# Patient Record
Sex: FEMALE | Race: WHITE | NOT HISPANIC OR LATINO | Employment: OTHER
[De-identification: names, ages, dates, MRNs, and addresses within clinical notes are randomized per-mention and may not be internally consistent; named-entity substitution may affect disease eponyms.]

---

## 2017-04-27 ENCOUNTER — RECORDS - HEALTHEAST (OUTPATIENT)
Dept: ADMINISTRATIVE | Facility: OTHER | Age: 62
End: 2017-04-27

## 2017-05-10 ENCOUNTER — RECORDS - HEALTHEAST (OUTPATIENT)
Dept: ADMINISTRATIVE | Facility: OTHER | Age: 62
End: 2017-05-10

## 2017-05-10 ENCOUNTER — RECORDS - HEALTHEAST (OUTPATIENT)
Dept: LAB | Facility: CLINIC | Age: 62
End: 2017-05-10

## 2017-05-10 LAB
CREAT SERPL-MCNC: 0.97 MG/DL (ref 0.6–1.1)
GFR SERPL CREATININE-BSD FRML MDRD: 58 ML/MIN/1.73M2

## 2017-05-15 ENCOUNTER — INFUSION - HEALTHEAST (OUTPATIENT)
Dept: INFUSION THERAPY | Facility: CLINIC | Age: 62
End: 2017-05-15

## 2017-05-15 DIAGNOSIS — M81.0 OSTEOPOROSIS: ICD-10-CM

## 2018-06-01 ENCOUNTER — RECORDS - HEALTHEAST (OUTPATIENT)
Dept: ADMINISTRATIVE | Facility: OTHER | Age: 63
End: 2018-06-01

## 2018-06-01 ENCOUNTER — RECORDS - HEALTHEAST (OUTPATIENT)
Dept: LAB | Facility: CLINIC | Age: 63
End: 2018-06-01

## 2018-06-01 LAB
ALBUMIN SERPL-MCNC: 4.3 G/DL (ref 3.5–5)
ALP SERPL-CCNC: 67 U/L (ref 45–120)
ALT SERPL W P-5'-P-CCNC: 14 U/L (ref 0–45)
ANION GAP SERPL CALCULATED.3IONS-SCNC: 10 MMOL/L (ref 5–18)
AST SERPL W P-5'-P-CCNC: 17 U/L (ref 0–40)
BILIRUB SERPL-MCNC: 0.5 MG/DL (ref 0–1)
BUN SERPL-MCNC: 14 MG/DL (ref 8–22)
CALCIUM SERPL-MCNC: 10.2 MG/DL (ref 8.5–10.5)
CHLORIDE BLD-SCNC: 103 MMOL/L (ref 98–107)
CHOLEST SERPL-MCNC: 248 MG/DL
CO2 SERPL-SCNC: 26 MMOL/L (ref 22–31)
CREAT SERPL-MCNC: 0.74 MG/DL (ref 0.6–1.1)
FASTING STATUS PATIENT QL REPORTED: ABNORMAL
GFR SERPL CREATININE-BSD FRML MDRD: >60 ML/MIN/1.73M2
GLUCOSE BLD-MCNC: 85 MG/DL (ref 70–125)
HDLC SERPL-MCNC: 85 MG/DL
LDLC SERPL CALC-MCNC: 146 MG/DL
POTASSIUM BLD-SCNC: 3.8 MMOL/L (ref 3.5–5)
PROT SERPL-MCNC: 7.3 G/DL (ref 6–8)
SODIUM SERPL-SCNC: 139 MMOL/L (ref 136–145)
TRIGL SERPL-MCNC: 86 MG/DL

## 2018-06-14 ENCOUNTER — RECORDS - HEALTHEAST (OUTPATIENT)
Dept: ADMINISTRATIVE | Facility: OTHER | Age: 63
End: 2018-06-14

## 2018-06-15 ENCOUNTER — RECORDS - HEALTHEAST (OUTPATIENT)
Dept: ADMINISTRATIVE | Facility: OTHER | Age: 63
End: 2018-06-15

## 2018-06-25 ENCOUNTER — INFUSION - HEALTHEAST (OUTPATIENT)
Dept: INFUSION THERAPY | Facility: CLINIC | Age: 63
End: 2018-06-25

## 2018-06-25 DIAGNOSIS — M81.0 SENILE OSTEOPOROSIS: ICD-10-CM

## 2019-06-03 ENCOUNTER — RECORDS - HEALTHEAST (OUTPATIENT)
Dept: LAB | Facility: CLINIC | Age: 64
End: 2019-06-03

## 2019-06-03 LAB
ALBUMIN SERPL-MCNC: 4.1 G/DL (ref 3.5–5)
ALP SERPL-CCNC: 69 U/L (ref 45–120)
ALT SERPL W P-5'-P-CCNC: 18 U/L (ref 0–45)
ANION GAP SERPL CALCULATED.3IONS-SCNC: 7 MMOL/L (ref 5–18)
AST SERPL W P-5'-P-CCNC: 19 U/L (ref 0–40)
BILIRUB SERPL-MCNC: 0.4 MG/DL (ref 0–1)
BUN SERPL-MCNC: 14 MG/DL (ref 8–22)
CALCIUM SERPL-MCNC: 9.8 MG/DL (ref 8.5–10.5)
CHLORIDE BLD-SCNC: 105 MMOL/L (ref 98–107)
CHOLEST SERPL-MCNC: 231 MG/DL
CO2 SERPL-SCNC: 27 MMOL/L (ref 22–31)
CREAT SERPL-MCNC: 0.72 MG/DL (ref 0.6–1.1)
FASTING STATUS PATIENT QL REPORTED: ABNORMAL
GFR SERPL CREATININE-BSD FRML MDRD: >60 ML/MIN/1.73M2
GLUCOSE BLD-MCNC: 80 MG/DL (ref 70–125)
HDLC SERPL-MCNC: 78 MG/DL
LDLC SERPL CALC-MCNC: 130 MG/DL
POTASSIUM BLD-SCNC: 3.8 MMOL/L (ref 3.5–5)
PROT SERPL-MCNC: 7 G/DL (ref 6–8)
SODIUM SERPL-SCNC: 139 MMOL/L (ref 136–145)
TRIGL SERPL-MCNC: 114 MG/DL

## 2019-06-04 LAB — 25(OH)D3 SERPL-MCNC: 42 NG/ML (ref 30–80)

## 2019-06-27 ENCOUNTER — INFUSION - HEALTHEAST (OUTPATIENT)
Dept: INFUSION THERAPY | Facility: CLINIC | Age: 64
End: 2019-06-27

## 2019-06-27 DIAGNOSIS — M81.0 SENILE OSTEOPOROSIS: ICD-10-CM

## 2020-06-26 ENCOUNTER — RECORDS - HEALTHEAST (OUTPATIENT)
Dept: LAB | Facility: CLINIC | Age: 65
End: 2020-06-26

## 2020-06-26 LAB
ALBUMIN SERPL-MCNC: 4.4 G/DL (ref 3.5–5)
ALP SERPL-CCNC: 77 U/L (ref 45–120)
ALT SERPL W P-5'-P-CCNC: 18 U/L (ref 0–45)
ANION GAP SERPL CALCULATED.3IONS-SCNC: 12 MMOL/L (ref 5–18)
AST SERPL W P-5'-P-CCNC: 20 U/L (ref 0–40)
BILIRUB SERPL-MCNC: 0.5 MG/DL (ref 0–1)
BUN SERPL-MCNC: 13 MG/DL (ref 8–22)
CALCIUM SERPL-MCNC: 9.8 MG/DL (ref 8.5–10.5)
CHLORIDE BLD-SCNC: 103 MMOL/L (ref 98–107)
CHOLEST SERPL-MCNC: 255 MG/DL
CO2 SERPL-SCNC: 24 MMOL/L (ref 22–31)
CREAT SERPL-MCNC: 0.77 MG/DL (ref 0.6–1.1)
FASTING STATUS PATIENT QL REPORTED: ABNORMAL
GFR SERPL CREATININE-BSD FRML MDRD: >60 ML/MIN/1.73M2
GLUCOSE BLD-MCNC: 85 MG/DL (ref 70–125)
HDLC SERPL-MCNC: 83 MG/DL
LDLC SERPL CALC-MCNC: 154 MG/DL
POTASSIUM BLD-SCNC: 3.5 MMOL/L (ref 3.5–5)
PROT SERPL-MCNC: 7.3 G/DL (ref 6–8)
SODIUM SERPL-SCNC: 139 MMOL/L (ref 136–145)
TRIGL SERPL-MCNC: 88 MG/DL

## 2020-06-29 ENCOUNTER — RECORDS - HEALTHEAST (OUTPATIENT)
Dept: ADMINISTRATIVE | Facility: OTHER | Age: 65
End: 2020-06-29

## 2020-06-29 LAB — 25(OH)D3 SERPL-MCNC: 33.1 NG/ML (ref 30–80)

## 2020-07-01 ENCOUNTER — AMBULATORY - HEALTHEAST (OUTPATIENT)
Dept: PHARMACY | Facility: CLINIC | Age: 65
End: 2020-07-01

## 2020-07-09 ENCOUNTER — INFUSION - HEALTHEAST (OUTPATIENT)
Dept: INFUSION THERAPY | Facility: CLINIC | Age: 65
End: 2020-07-09

## 2020-07-09 DIAGNOSIS — M81.0 SENILE OSTEOPOROSIS: ICD-10-CM

## 2020-07-09 ASSESSMENT — MIFFLIN-ST. JEOR: SCORE: 1082.93

## 2021-05-12 ENCOUNTER — AMBULATORY - HEALTHEAST (OUTPATIENT)
Dept: PHARMACY | Facility: HOSPITAL | Age: 66
End: 2021-05-12

## 2021-06-04 VITALS — BODY MASS INDEX: 23.92 KG/M2 | HEIGHT: 62 IN | WEIGHT: 130 LBS

## 2021-06-10 NOTE — PROGRESS NOTES
Pt admitted [per pedis for her 2nd dose of reclast. Pt states she has spent hurtado in [place] and her sister then comes to live with her in the summer. States hx of osteoporosis and has been on several different medications without any improvement per bone scan. Pt enjoys playing [employer] and crossword puzzles. Napping currently. IV started and reclast infused without difficulty.

## 2021-06-16 PROBLEM — M81.0 SENILE OSTEOPOROSIS: Status: ACTIVE | Noted: 2018-06-22

## 2021-06-18 NOTE — PROGRESS NOTES
"Pt states she doesn't have any side effects from zometa, and feels as if it keeps her \" stable\"  No c/o's with infusion vss  "

## 2021-06-22 DIAGNOSIS — M81.0 SENILE OSTEOPOROSIS: Primary | ICD-10-CM

## 2021-06-22 RX ORDER — MEPERIDINE HYDROCHLORIDE 25 MG/ML
25 INJECTION INTRAMUSCULAR; INTRAVENOUS; SUBCUTANEOUS EVERY 30 MIN PRN
Status: CANCELLED | OUTPATIENT
Start: 2021-07-09

## 2021-06-22 RX ORDER — METHYLPREDNISOLONE SODIUM SUCCINATE 125 MG/2ML
125 INJECTION, POWDER, LYOPHILIZED, FOR SOLUTION INTRAMUSCULAR; INTRAVENOUS
Status: CANCELLED
Start: 2021-07-09

## 2021-06-22 RX ORDER — ALBUTEROL SULFATE 0.83 MG/ML
2.5 SOLUTION RESPIRATORY (INHALATION)
Status: CANCELLED | OUTPATIENT
Start: 2021-07-09

## 2021-06-22 RX ORDER — EPINEPHRINE 1 MG/ML
0.3 INJECTION, SOLUTION, CONCENTRATE INTRAVENOUS EVERY 5 MIN PRN
Status: CANCELLED | OUTPATIENT
Start: 2021-07-09

## 2021-06-22 RX ORDER — ZOLEDRONIC ACID 5 MG/100ML
5 INJECTION, SOLUTION INTRAVENOUS ONCE
Status: CANCELLED
Start: 2021-07-09 | End: 2021-07-09

## 2021-06-22 RX ORDER — NALOXONE HYDROCHLORIDE 0.4 MG/ML
0.2 INJECTION, SOLUTION INTRAMUSCULAR; INTRAVENOUS; SUBCUTANEOUS
Status: CANCELLED | OUTPATIENT
Start: 2021-07-09

## 2021-06-22 RX ORDER — DIPHENHYDRAMINE HYDROCHLORIDE 50 MG/ML
50 INJECTION INTRAMUSCULAR; INTRAVENOUS
Status: CANCELLED
Start: 2021-07-09

## 2021-06-22 RX ORDER — HEPARIN SODIUM,PORCINE 10 UNIT/ML
5 VIAL (ML) INTRAVENOUS
Status: CANCELLED | OUTPATIENT
Start: 2021-07-09

## 2021-06-22 RX ORDER — HEPARIN SODIUM (PORCINE) LOCK FLUSH IV SOLN 100 UNIT/ML 100 UNIT/ML
5 SOLUTION INTRAVENOUS
Status: CANCELLED | OUTPATIENT
Start: 2021-07-09

## 2021-06-22 RX ORDER — ALBUTEROL SULFATE 90 UG/1
1-2 AEROSOL, METERED RESPIRATORY (INHALATION)
Status: CANCELLED
Start: 2021-07-09

## 2021-06-26 ENCOUNTER — COMMUNICATION - HEALTHEAST (OUTPATIENT)
Dept: ADMINISTRATIVE | Facility: HOSPITAL | Age: 66
End: 2021-06-26

## 2021-06-28 ENCOUNTER — RECORDS - HEALTHEAST (OUTPATIENT)
Dept: LAB | Facility: CLINIC | Age: 66
End: 2021-06-28

## 2021-06-28 ENCOUNTER — TRANSFERRED RECORDS (OUTPATIENT)
Dept: HEALTH INFORMATION MANAGEMENT | Facility: CLINIC | Age: 66
End: 2021-06-28

## 2021-06-28 LAB
ALBUMIN SERPL-MCNC: 4.3 G/DL (ref 3.5–5)
ALP SERPL-CCNC: 66 U/L (ref 45–120)
ALT SERPL W P-5'-P-CCNC: 14 U/L (ref 0–45)
ANION GAP SERPL CALCULATED.3IONS-SCNC: 13 MMOL/L (ref 5–18)
AST SERPL W P-5'-P-CCNC: 19 U/L (ref 0–40)
BILIRUB SERPL-MCNC: 0.7 MG/DL (ref 0–1)
BUN SERPL-MCNC: 15 MG/DL (ref 8–22)
CALCIUM SERPL-MCNC: 9.7 MG/DL (ref 8.5–10.5)
CHLORIDE BLD-SCNC: 104 MMOL/L (ref 98–107)
CHOLEST SERPL-MCNC: 239 MG/DL
CO2 SERPL-SCNC: 23 MMOL/L (ref 22–31)
CREAT SERPL-MCNC: 0.72 MG/DL (ref 0.6–1.1)
FASTING STATUS PATIENT QL REPORTED: ABNORMAL
GFR SERPL CREATININE-BSD FRML MDRD: >60 ML/MIN/1.73M2
GLUCOSE BLD-MCNC: 92 MG/DL (ref 70–125)
HDLC SERPL-MCNC: 84 MG/DL
LDLC SERPL CALC-MCNC: 134 MG/DL
POTASSIUM BLD-SCNC: 4 MMOL/L (ref 3.5–5)
PROT SERPL-MCNC: 7.1 G/DL (ref 6–8)
SODIUM SERPL-SCNC: 140 MMOL/L (ref 136–145)
TRIGL SERPL-MCNC: 105 MG/DL
TSH SERPL DL<=0.005 MIU/L-ACNC: 1.87 UIU/ML (ref 0.3–5)

## 2021-06-29 LAB — 25(OH)D3 SERPL-MCNC: 31.9 NG/ML (ref 30–80)

## 2021-07-05 ENCOUNTER — TELEPHONE (OUTPATIENT)
Dept: INFUSION THERAPY | Facility: CLINIC | Age: 66
End: 2021-07-05

## 2021-07-05 ENCOUNTER — AMBULATORY - HEALTHEAST (OUTPATIENT)
Dept: PHARMACY | Facility: CLINIC | Age: 66
End: 2021-07-05

## 2021-07-05 DIAGNOSIS — M81.0 SENILE OSTEOPOROSIS: ICD-10-CM

## 2021-07-09 ENCOUNTER — TELEPHONE (OUTPATIENT)
Dept: INFUSION THERAPY | Facility: CLINIC | Age: 66
End: 2021-07-09

## 2021-07-29 ENCOUNTER — INFUSION THERAPY VISIT (OUTPATIENT)
Dept: INFUSION THERAPY | Facility: CLINIC | Age: 66
End: 2021-07-29
Attending: FAMILY MEDICINE
Payer: MEDICARE

## 2021-07-29 VITALS
RESPIRATION RATE: 16 BRPM | DIASTOLIC BLOOD PRESSURE: 77 MMHG | HEART RATE: 77 BPM | SYSTOLIC BLOOD PRESSURE: 115 MMHG | TEMPERATURE: 98.2 F | HEIGHT: 62 IN | WEIGHT: 130 LBS | BODY MASS INDEX: 23.92 KG/M2 | OXYGEN SATURATION: 97 %

## 2021-07-29 DIAGNOSIS — M81.0 SENILE OSTEOPOROSIS: Primary | ICD-10-CM

## 2021-07-29 LAB
CREAT SERPL-MCNC: 0.7 MG/DL (ref 0.6–1.1)
GFR SERPL CREATININE-BSD FRML MDRD: >90 ML/MIN/1.73M2

## 2021-07-29 PROCEDURE — 96365 THER/PROPH/DIAG IV INF INIT: CPT

## 2021-07-29 PROCEDURE — 82565 ASSAY OF CREATININE: CPT

## 2021-07-29 PROCEDURE — 36415 COLL VENOUS BLD VENIPUNCTURE: CPT

## 2021-07-29 PROCEDURE — 250N000011 HC RX IP 250 OP 636: Performed by: FAMILY MEDICINE

## 2021-07-29 RX ORDER — DIPHENHYDRAMINE HYDROCHLORIDE 50 MG/ML
50 INJECTION INTRAMUSCULAR; INTRAVENOUS
Status: CANCELLED
Start: 2021-07-29

## 2021-07-29 RX ORDER — EPINEPHRINE 1 MG/ML
0.3 INJECTION, SOLUTION INTRAMUSCULAR; SUBCUTANEOUS EVERY 5 MIN PRN
Status: CANCELLED | OUTPATIENT
Start: 2021-07-29

## 2021-07-29 RX ORDER — ZOLEDRONIC ACID 5 MG/100ML
5 INJECTION, SOLUTION INTRAVENOUS ONCE
Status: COMPLETED | OUTPATIENT
Start: 2021-07-29 | End: 2021-07-29

## 2021-07-29 RX ORDER — METHYLPREDNISOLONE SODIUM SUCCINATE 125 MG/2ML
125 INJECTION, POWDER, LYOPHILIZED, FOR SOLUTION INTRAMUSCULAR; INTRAVENOUS
Status: CANCELLED
Start: 2021-07-29

## 2021-07-29 RX ORDER — HEPARIN SODIUM (PORCINE) LOCK FLUSH IV SOLN 100 UNIT/ML 100 UNIT/ML
5 SOLUTION INTRAVENOUS
Status: CANCELLED | OUTPATIENT
Start: 2021-07-29

## 2021-07-29 RX ORDER — MEPERIDINE HYDROCHLORIDE 50 MG/ML
25 INJECTION INTRAMUSCULAR; INTRAVENOUS; SUBCUTANEOUS EVERY 30 MIN PRN
Status: CANCELLED | OUTPATIENT
Start: 2021-07-29

## 2021-07-29 RX ORDER — ALBUTEROL SULFATE 90 UG/1
1-2 AEROSOL, METERED RESPIRATORY (INHALATION)
Status: CANCELLED
Start: 2021-07-29

## 2021-07-29 RX ORDER — ALBUTEROL SULFATE 0.83 MG/ML
2.5 SOLUTION RESPIRATORY (INHALATION)
Status: CANCELLED | OUTPATIENT
Start: 2021-07-29

## 2021-07-29 RX ORDER — NALOXONE HYDROCHLORIDE 0.4 MG/ML
0.2 INJECTION, SOLUTION INTRAMUSCULAR; INTRAVENOUS; SUBCUTANEOUS
Status: CANCELLED | OUTPATIENT
Start: 2021-07-29

## 2021-07-29 RX ORDER — HEPARIN SODIUM,PORCINE 10 UNIT/ML
5 VIAL (ML) INTRAVENOUS
Status: CANCELLED | OUTPATIENT
Start: 2021-07-29

## 2021-07-29 RX ORDER — ZOLEDRONIC ACID 5 MG/100ML
5 INJECTION, SOLUTION INTRAVENOUS ONCE
Status: CANCELLED
Start: 2021-07-29 | End: 2021-07-29

## 2021-07-29 RX ADMIN — ZOLEDRONIC ACID 5 MG: 5 INJECTION, SOLUTION INTRAVENOUS at 12:18

## 2021-07-29 ASSESSMENT — MIFFLIN-ST. JEOR: SCORE: 1082.93

## 2022-06-29 ENCOUNTER — TRANSFERRED RECORDS (OUTPATIENT)
Dept: HEALTH INFORMATION MANAGEMENT | Facility: CLINIC | Age: 67
End: 2022-06-29

## 2022-06-29 ENCOUNTER — LAB REQUISITION (OUTPATIENT)
Dept: LAB | Facility: CLINIC | Age: 67
End: 2022-06-29
Payer: MEDICARE

## 2022-06-29 DIAGNOSIS — M81.0 AGE-RELATED OSTEOPOROSIS WITHOUT CURRENT PATHOLOGICAL FRACTURE: ICD-10-CM

## 2022-06-29 DIAGNOSIS — Z00.00 ENCOUNTER FOR GENERAL ADULT MEDICAL EXAMINATION WITHOUT ABNORMAL FINDINGS: ICD-10-CM

## 2022-06-29 PROCEDURE — 80053 COMPREHEN METABOLIC PANEL: CPT | Mod: ORL | Performed by: FAMILY MEDICINE

## 2022-06-29 PROCEDURE — 82306 VITAMIN D 25 HYDROXY: CPT | Mod: ORL | Performed by: FAMILY MEDICINE

## 2022-06-29 PROCEDURE — 80061 LIPID PANEL: CPT | Mod: ORL | Performed by: FAMILY MEDICINE

## 2022-06-30 LAB
ALBUMIN SERPL BCG-MCNC: 4.7 G/DL (ref 3.5–5.2)
ALP SERPL-CCNC: 67 U/L (ref 35–104)
ALT SERPL W P-5'-P-CCNC: 19 U/L (ref 10–35)
ANION GAP SERPL CALCULATED.3IONS-SCNC: 14 MMOL/L (ref 7–15)
AST SERPL W P-5'-P-CCNC: 22 U/L (ref 10–35)
BILIRUB SERPL-MCNC: 0.4 MG/DL
BUN SERPL-MCNC: 15.9 MG/DL (ref 8–23)
CALCIUM SERPL-MCNC: 9.9 MG/DL (ref 8.8–10.2)
CHLORIDE SERPL-SCNC: 102 MMOL/L (ref 98–107)
CHOLEST SERPL-MCNC: 256 MG/DL
CREAT SERPL-MCNC: 0.76 MG/DL (ref 0.51–0.95)
DEPRECATED HCO3 PLAS-SCNC: 24 MMOL/L (ref 22–29)
GFR SERPL CREATININE-BSD FRML MDRD: 85 ML/MIN/1.73M2
GLUCOSE SERPL-MCNC: 94 MG/DL (ref 70–99)
HDLC SERPL-MCNC: 95 MG/DL
LDLC SERPL CALC-MCNC: 144 MG/DL
NONHDLC SERPL-MCNC: 161 MG/DL
POTASSIUM SERPL-SCNC: 3.9 MMOL/L (ref 3.4–5.3)
PROT SERPL-MCNC: 7.1 G/DL (ref 6.4–8.3)
SODIUM SERPL-SCNC: 140 MMOL/L (ref 136–145)
TRIGL SERPL-MCNC: 85 MG/DL

## 2022-07-01 ENCOUNTER — MEDICAL CORRESPONDENCE (OUTPATIENT)
Dept: HEALTH INFORMATION MANAGEMENT | Facility: CLINIC | Age: 67
End: 2022-07-01

## 2022-07-01 LAB — DEPRECATED CALCIDIOL+CALCIFEROL SERPL-MC: 42 UG/L (ref 20–75)

## 2022-07-07 DIAGNOSIS — M81.0 SENILE OSTEOPOROSIS: Primary | ICD-10-CM

## 2022-07-07 RX ORDER — ALBUTEROL SULFATE 0.83 MG/ML
2.5 SOLUTION RESPIRATORY (INHALATION)
Status: CANCELLED | OUTPATIENT
Start: 2022-07-08

## 2022-07-07 RX ORDER — HEPARIN SODIUM,PORCINE 10 UNIT/ML
5 VIAL (ML) INTRAVENOUS
Status: CANCELLED | OUTPATIENT
Start: 2022-07-08

## 2022-07-07 RX ORDER — METHYLPREDNISOLONE SODIUM SUCCINATE 125 MG/2ML
125 INJECTION, POWDER, LYOPHILIZED, FOR SOLUTION INTRAMUSCULAR; INTRAVENOUS
Status: CANCELLED
Start: 2022-07-08

## 2022-07-07 RX ORDER — MEPERIDINE HYDROCHLORIDE 25 MG/ML
25 INJECTION INTRAMUSCULAR; INTRAVENOUS; SUBCUTANEOUS EVERY 30 MIN PRN
Status: CANCELLED | OUTPATIENT
Start: 2022-07-08

## 2022-07-07 RX ORDER — EPINEPHRINE 1 MG/ML
0.3 INJECTION, SOLUTION, CONCENTRATE INTRAVENOUS EVERY 5 MIN PRN
Status: CANCELLED | OUTPATIENT
Start: 2022-07-08

## 2022-07-07 RX ORDER — HEPARIN SODIUM (PORCINE) LOCK FLUSH IV SOLN 100 UNIT/ML 100 UNIT/ML
5 SOLUTION INTRAVENOUS
Status: CANCELLED | OUTPATIENT
Start: 2022-07-08

## 2022-07-07 RX ORDER — ZOLEDRONIC ACID 5 MG/100ML
5 INJECTION, SOLUTION INTRAVENOUS ONCE
Status: CANCELLED
Start: 2022-07-08

## 2022-07-07 RX ORDER — ACETAMINOPHEN 325 MG/1
650 TABLET ORAL ONCE
Status: CANCELLED | OUTPATIENT
Start: 2022-07-08

## 2022-07-07 RX ORDER — DIPHENHYDRAMINE HYDROCHLORIDE 50 MG/ML
50 INJECTION INTRAMUSCULAR; INTRAVENOUS
Status: CANCELLED
Start: 2022-07-08

## 2022-07-07 RX ORDER — ALBUTEROL SULFATE 90 UG/1
1-2 AEROSOL, METERED RESPIRATORY (INHALATION)
Status: CANCELLED
Start: 2022-07-08

## 2022-08-01 ENCOUNTER — INFUSION THERAPY VISIT (OUTPATIENT)
Dept: INFUSION THERAPY | Facility: CLINIC | Age: 67
End: 2022-08-01
Attending: FAMILY MEDICINE
Payer: MEDICARE

## 2022-08-01 ENCOUNTER — MEDICAL CORRESPONDENCE (OUTPATIENT)
Dept: HEALTH INFORMATION MANAGEMENT | Facility: CLINIC | Age: 67
End: 2022-08-01

## 2022-08-01 VITALS
SYSTOLIC BLOOD PRESSURE: 117 MMHG | RESPIRATION RATE: 16 BRPM | OXYGEN SATURATION: 97 % | DIASTOLIC BLOOD PRESSURE: 67 MMHG | HEART RATE: 66 BPM | BODY MASS INDEX: 23.78 KG/M2 | WEIGHT: 130 LBS

## 2022-08-01 DIAGNOSIS — M81.0 SENILE OSTEOPOROSIS: Primary | ICD-10-CM

## 2022-08-01 PROCEDURE — 250N000011 HC RX IP 250 OP 636: Performed by: FAMILY MEDICINE

## 2022-08-01 PROCEDURE — 96365 THER/PROPH/DIAG IV INF INIT: CPT

## 2022-08-01 RX ORDER — ZOLEDRONIC ACID 5 MG/100ML
5 INJECTION, SOLUTION INTRAVENOUS ONCE
Status: CANCELLED
Start: 2022-08-01

## 2022-08-01 RX ORDER — HEPARIN SODIUM (PORCINE) LOCK FLUSH IV SOLN 100 UNIT/ML 100 UNIT/ML
5 SOLUTION INTRAVENOUS
Status: CANCELLED | OUTPATIENT
Start: 2022-08-01

## 2022-08-01 RX ORDER — ALBUTEROL SULFATE 0.83 MG/ML
2.5 SOLUTION RESPIRATORY (INHALATION)
Status: CANCELLED | OUTPATIENT
Start: 2022-08-01

## 2022-08-01 RX ORDER — METHYLPREDNISOLONE SODIUM SUCCINATE 125 MG/2ML
125 INJECTION, POWDER, LYOPHILIZED, FOR SOLUTION INTRAMUSCULAR; INTRAVENOUS
Status: CANCELLED
Start: 2022-08-01

## 2022-08-01 RX ORDER — HEPARIN SODIUM,PORCINE 10 UNIT/ML
5 VIAL (ML) INTRAVENOUS
Status: CANCELLED | OUTPATIENT
Start: 2022-08-01

## 2022-08-01 RX ORDER — ACETAMINOPHEN 325 MG/1
650 TABLET ORAL ONCE
Status: CANCELLED | OUTPATIENT
Start: 2022-08-01

## 2022-08-01 RX ORDER — ZOLEDRONIC ACID 5 MG/100ML
5 INJECTION, SOLUTION INTRAVENOUS ONCE
Status: COMPLETED | OUTPATIENT
Start: 2022-08-01 | End: 2022-08-01

## 2022-08-01 RX ORDER — EPINEPHRINE 1 MG/ML
0.3 INJECTION, SOLUTION, CONCENTRATE INTRAVENOUS EVERY 5 MIN PRN
Status: CANCELLED | OUTPATIENT
Start: 2022-08-01

## 2022-08-01 RX ORDER — ALBUTEROL SULFATE 90 UG/1
1-2 AEROSOL, METERED RESPIRATORY (INHALATION)
Status: CANCELLED
Start: 2022-08-01

## 2022-08-01 RX ORDER — DIPHENHYDRAMINE HYDROCHLORIDE 50 MG/ML
50 INJECTION INTRAMUSCULAR; INTRAVENOUS
Status: CANCELLED
Start: 2022-08-01

## 2022-08-01 RX ORDER — MEPERIDINE HYDROCHLORIDE 50 MG/ML
25 INJECTION INTRAMUSCULAR; INTRAVENOUS; SUBCUTANEOUS EVERY 30 MIN PRN
Status: CANCELLED | OUTPATIENT
Start: 2022-08-01

## 2022-08-01 RX ADMIN — ZOLEDRONIC ACID 5 MG: 0.05 INJECTION, SOLUTION INTRAVENOUS at 12:56

## 2022-08-01 NOTE — PROGRESS NOTES
Infusion Nursing Note:  Cici Martinez presents today for infusion    Patient seen by provider today: No   present during visit today: Not Applicable.    Note: Lab test completed last month.  Pt states that her osteoporosis is remaining stable    Intravenous Access:  Peripheral IV placed.    Treatment Conditions:  Results reviewed, labs MET treatment parameters, ok to proceed with treatment.    Post Infusion Assessment:  Patient tolerated infusion without incident.     Discharge Plan:   Patient discharged in stable condition accompanied by: self.      Nicole MONTEJO RN

## 2022-09-27 ENCOUNTER — HOSPITAL ENCOUNTER (OUTPATIENT)
Facility: CLINIC | Age: 67
Setting detail: OBSERVATION
Discharge: HOME | End: 2022-09-29
Attending: EMERGENCY MEDICINE
Payer: MEDICARE

## 2022-09-27 ENCOUNTER — APPOINTMENT (OUTPATIENT)
Dept: CT IMAGING | Facility: CLINIC | Age: 67
End: 2022-09-27
Attending: EMERGENCY MEDICINE
Payer: MEDICARE

## 2022-09-27 DIAGNOSIS — K52.9 GASTROENTERITIS: ICD-10-CM

## 2022-09-27 DIAGNOSIS — E83.39 HYPOPHOSPHATEMIA: ICD-10-CM

## 2022-09-27 DIAGNOSIS — A09 TRAVELER'S DIARRHEA: Primary | ICD-10-CM

## 2022-09-27 DIAGNOSIS — E87.6 HYPOKALEMIA: ICD-10-CM

## 2022-09-27 PROBLEM — M47.22 CERVICAL SPONDYLOSIS WITH RADICULOPATHY: Status: ACTIVE | Noted: 2018-02-12

## 2022-09-27 LAB
ANION GAP SERPL CALCULATED.3IONS-SCNC: 12 MMOL/L (ref 5–18)
BASOPHILS # BLD AUTO: 0 10E3/UL (ref 0–0.2)
BASOPHILS NFR BLD AUTO: 0 %
BUN SERPL-MCNC: 10 MG/DL (ref 8–22)
C DIFF TOX B STL QL: NEGATIVE
CALCIUM SERPL-MCNC: 8.7 MG/DL (ref 8.5–10.5)
CHLORIDE BLD-SCNC: 98 MMOL/L (ref 98–107)
CO2 SERPL-SCNC: 25 MMOL/L (ref 22–31)
CREAT SERPL-MCNC: 0.63 MG/DL (ref 0.6–1.1)
EOSINOPHIL # BLD AUTO: 0.1 10E3/UL (ref 0–0.7)
EOSINOPHIL NFR BLD AUTO: 2 %
ERYTHROCYTE [DISTWIDTH] IN BLOOD BY AUTOMATED COUNT: 13.3 % (ref 10–15)
GFR SERPL CREATININE-BSD FRML MDRD: >90 ML/MIN/1.73M2
GLUCOSE BLD-MCNC: 95 MG/DL (ref 70–125)
HCT VFR BLD AUTO: 32.8 % (ref 35–47)
HGB BLD-MCNC: 11.5 G/DL (ref 11.7–15.7)
IMM GRANULOCYTES # BLD: 0 10E3/UL
IMM GRANULOCYTES NFR BLD: 1 %
LYMPHOCYTES # BLD AUTO: 0.9 10E3/UL (ref 0.8–5.3)
LYMPHOCYTES NFR BLD AUTO: 22 %
MAGNESIUM SERPL-MCNC: 2.1 MG/DL (ref 1.8–2.6)
MCH RBC QN AUTO: 29.3 PG (ref 26.5–33)
MCHC RBC AUTO-ENTMCNC: 35.1 G/DL (ref 31.5–36.5)
MCV RBC AUTO: 84 FL (ref 78–100)
MONOCYTES # BLD AUTO: 0.5 10E3/UL (ref 0–1.3)
MONOCYTES NFR BLD AUTO: 11 %
NEUTROPHILS # BLD AUTO: 2.6 10E3/UL (ref 1.6–8.3)
NEUTROPHILS NFR BLD AUTO: 64 %
NRBC # BLD AUTO: 0 10E3/UL
NRBC BLD AUTO-RTO: 0 /100
PLATELET # BLD AUTO: 210 10E3/UL (ref 150–450)
POTASSIUM BLD-SCNC: 2.7 MMOL/L (ref 3.5–5)
PROCALCITONIN SERPL-MCNC: 0.03 NG/ML (ref 0–0.49)
RBC # BLD AUTO: 3.92 10E6/UL (ref 3.8–5.2)
SARS-COV-2 RNA RESP QL NAA+PROBE: NEGATIVE
SODIUM SERPL-SCNC: 135 MMOL/L (ref 136–145)
WBC # BLD AUTO: 4 10E3/UL (ref 4–11)

## 2022-09-27 PROCEDURE — 258N000003 HC RX IP 258 OP 636: Performed by: PHYSICIAN ASSISTANT

## 2022-09-27 PROCEDURE — 250N000011 HC RX IP 250 OP 636: Performed by: PHYSICIAN ASSISTANT

## 2022-09-27 PROCEDURE — 83735 ASSAY OF MAGNESIUM: CPT | Performed by: PHYSICIAN ASSISTANT

## 2022-09-27 PROCEDURE — 250N000013 HC RX MED GY IP 250 OP 250 PS 637: Performed by: HOSPITALIST

## 2022-09-27 PROCEDURE — 36415 COLL VENOUS BLD VENIPUNCTURE: CPT | Performed by: HOSPITALIST

## 2022-09-27 PROCEDURE — 85025 COMPLETE CBC W/AUTO DIFF WBC: CPT | Performed by: HOSPITALIST

## 2022-09-27 PROCEDURE — 258N000003 HC RX IP 258 OP 636: Performed by: HOSPITALIST

## 2022-09-27 PROCEDURE — 87493 C DIFF AMPLIFIED PROBE: CPT | Mod: XU | Performed by: PHYSICIAN ASSISTANT

## 2022-09-27 PROCEDURE — C9803 HOPD COVID-19 SPEC COLLECT: HCPCS

## 2022-09-27 PROCEDURE — 74177 CT ABD & PELVIS W/CONTRAST: CPT | Mod: MG

## 2022-09-27 PROCEDURE — 99220 PR INITIAL OBSERVATION CARE,LEVEL III: CPT | Performed by: HOSPITALIST

## 2022-09-27 PROCEDURE — 96365 THER/PROPH/DIAG IV INF INIT: CPT | Mod: 59

## 2022-09-27 PROCEDURE — 99285 EMERGENCY DEPT VISIT HI MDM: CPT | Mod: 25

## 2022-09-27 PROCEDURE — U0005 INFEC AGEN DETEC AMPLI PROBE: HCPCS | Performed by: HOSPITALIST

## 2022-09-27 PROCEDURE — 84145 PROCALCITONIN (PCT): CPT | Performed by: HOSPITALIST

## 2022-09-27 PROCEDURE — 80048 BASIC METABOLIC PNL TOTAL CA: CPT | Performed by: PHYSICIAN ASSISTANT

## 2022-09-27 PROCEDURE — G0378 HOSPITAL OBSERVATION PER HR: HCPCS

## 2022-09-27 PROCEDURE — 87506 IADNA-DNA/RNA PROBE TQ 6-11: CPT | Performed by: PHYSICIAN ASSISTANT

## 2022-09-27 PROCEDURE — 36415 COLL VENOUS BLD VENIPUNCTURE: CPT | Performed by: PHYSICIAN ASSISTANT

## 2022-09-27 PROCEDURE — 250N000011 HC RX IP 250 OP 636: Performed by: EMERGENCY MEDICINE

## 2022-09-27 PROCEDURE — 93005 ELECTROCARDIOGRAM TRACING: CPT | Performed by: EMERGENCY MEDICINE

## 2022-09-27 PROCEDURE — 250N000013 HC RX MED GY IP 250 OP 250 PS 637: Performed by: PHYSICIAN ASSISTANT

## 2022-09-27 PROCEDURE — G1010 CDSM STANSON: HCPCS

## 2022-09-27 RX ORDER — ACETAMINOPHEN 325 MG/1
650 TABLET ORAL EVERY 6 HOURS PRN
Status: DISCONTINUED | OUTPATIENT
Start: 2022-09-27 | End: 2022-09-29 | Stop reason: HOSPADM

## 2022-09-27 RX ORDER — IOPAMIDOL 755 MG/ML
100 INJECTION, SOLUTION INTRAVASCULAR ONCE
Status: COMPLETED | OUTPATIENT
Start: 2022-09-27 | End: 2022-09-27

## 2022-09-27 RX ORDER — MELOXICAM 7.5 MG/1
7.5 TABLET ORAL 2 TIMES DAILY PRN
Status: DISCONTINUED | OUTPATIENT
Start: 2022-09-27 | End: 2022-09-29 | Stop reason: HOSPADM

## 2022-09-27 RX ORDER — POTASSIUM CHLORIDE 1500 MG/1
40 TABLET, EXTENDED RELEASE ORAL ONCE
Status: COMPLETED | OUTPATIENT
Start: 2022-09-27 | End: 2022-09-27

## 2022-09-27 RX ORDER — BISACODYL 10 MG
10 SUPPOSITORY, RECTAL RECTAL DAILY PRN
Status: DISCONTINUED | OUTPATIENT
Start: 2022-09-27 | End: 2022-09-29 | Stop reason: HOSPADM

## 2022-09-27 RX ORDER — POTASSIUM CHLORIDE 7.45 MG/ML
10 INJECTION INTRAVENOUS ONCE
Status: COMPLETED | OUTPATIENT
Start: 2022-09-27 | End: 2022-09-27

## 2022-09-27 RX ORDER — ACETAMINOPHEN 650 MG/1
650 SUPPOSITORY RECTAL EVERY 6 HOURS PRN
Status: DISCONTINUED | OUTPATIENT
Start: 2022-09-27 | End: 2022-09-29 | Stop reason: HOSPADM

## 2022-09-27 RX ORDER — NICOTINE POLACRILEX 4 MG/1
20 GUM, CHEWING ORAL DAILY
COMMUNITY

## 2022-09-27 RX ORDER — SODIUM CHLORIDE 9 MG/ML
INJECTION, SOLUTION INTRAVENOUS CONTINUOUS
Status: DISCONTINUED | OUTPATIENT
Start: 2022-09-27 | End: 2022-09-29 | Stop reason: HOSPADM

## 2022-09-27 RX ORDER — ONDANSETRON 4 MG/1
4 TABLET, ORALLY DISINTEGRATING ORAL EVERY 6 HOURS PRN
Status: DISCONTINUED | OUTPATIENT
Start: 2022-09-27 | End: 2022-09-29 | Stop reason: HOSPADM

## 2022-09-27 RX ORDER — MELOXICAM 7.5 MG/1
7.5 TABLET ORAL 2 TIMES DAILY PRN
COMMUNITY
Start: 2022-08-22

## 2022-09-27 RX ORDER — RIZATRIPTAN BENZOATE 10 MG/1
10 TABLET ORAL DAILY PRN
Status: DISCONTINUED | OUTPATIENT
Start: 2022-09-27 | End: 2022-09-29 | Stop reason: HOSPADM

## 2022-09-27 RX ORDER — SERTRALINE HYDROCHLORIDE 100 MG/1
200 TABLET, FILM COATED ORAL DAILY
COMMUNITY
Start: 2022-06-29

## 2022-09-27 RX ORDER — MULTIPLE VITAMINS W/ MINERALS TAB 9MG-400MCG
1 TAB ORAL DAILY
Status: DISCONTINUED | OUTPATIENT
Start: 2022-09-28 | End: 2022-09-29 | Stop reason: HOSPADM

## 2022-09-27 RX ORDER — RIZATRIPTAN BENZOATE 10 MG/1
10 TABLET ORAL DAILY PRN
COMMUNITY
Start: 2022-06-29

## 2022-09-27 RX ORDER — POLYETHYLENE GLYCOL 3350 17 G/17G
17 POWDER, FOR SOLUTION ORAL DAILY PRN
Status: DISCONTINUED | OUTPATIENT
Start: 2022-09-27 | End: 2022-09-29 | Stop reason: HOSPADM

## 2022-09-27 RX ORDER — SODIUM CHLORIDE 9 MG/ML
INJECTION, SOLUTION INTRAVENOUS CONTINUOUS
Status: DISCONTINUED | OUTPATIENT
Start: 2022-09-27 | End: 2022-09-28

## 2022-09-27 RX ORDER — LIDOCAINE 40 MG/G
CREAM TOPICAL
Status: DISCONTINUED | OUTPATIENT
Start: 2022-09-27 | End: 2022-09-29 | Stop reason: HOSPADM

## 2022-09-27 RX ORDER — SERTRALINE HYDROCHLORIDE 100 MG/1
200 TABLET, FILM COATED ORAL DAILY
Status: DISCONTINUED | OUTPATIENT
Start: 2022-09-28 | End: 2022-09-29 | Stop reason: HOSPADM

## 2022-09-27 RX ORDER — GABAPENTIN 300 MG/1
300 CAPSULE ORAL 2 TIMES DAILY PRN
COMMUNITY
Start: 2022-06-29

## 2022-09-27 RX ORDER — CHLORAL HYDRATE 500 MG
1 CAPSULE ORAL DAILY
COMMUNITY

## 2022-09-27 RX ORDER — AMOXICILLIN 250 MG
2 CAPSULE ORAL 2 TIMES DAILY PRN
Status: DISCONTINUED | OUTPATIENT
Start: 2022-09-27 | End: 2022-09-29 | Stop reason: HOSPADM

## 2022-09-27 RX ORDER — AMOXICILLIN 250 MG
1 CAPSULE ORAL 2 TIMES DAILY PRN
Status: DISCONTINUED | OUTPATIENT
Start: 2022-09-27 | End: 2022-09-29 | Stop reason: HOSPADM

## 2022-09-27 RX ORDER — MULTIVIT-MIN/IRON/FOLIC ACID/K 18-600-40
1000 CAPSULE ORAL DAILY
COMMUNITY

## 2022-09-27 RX ORDER — PANTOPRAZOLE SODIUM 20 MG/1
40 TABLET, DELAYED RELEASE ORAL DAILY
Status: DISCONTINUED | OUTPATIENT
Start: 2022-09-28 | End: 2022-09-29 | Stop reason: HOSPADM

## 2022-09-27 RX ORDER — GABAPENTIN 300 MG/1
300 CAPSULE ORAL 2 TIMES DAILY PRN
Status: DISCONTINUED | OUTPATIENT
Start: 2022-09-27 | End: 2022-09-29 | Stop reason: HOSPADM

## 2022-09-27 RX ORDER — ONDANSETRON 2 MG/ML
4 INJECTION INTRAMUSCULAR; INTRAVENOUS EVERY 6 HOURS PRN
Status: DISCONTINUED | OUTPATIENT
Start: 2022-09-27 | End: 2022-09-29 | Stop reason: HOSPADM

## 2022-09-27 RX ORDER — VITAMIN B COMPLEX
1000 TABLET ORAL DAILY
Status: DISCONTINUED | OUTPATIENT
Start: 2022-09-28 | End: 2022-09-29 | Stop reason: HOSPADM

## 2022-09-27 RX ADMIN — POTASSIUM CHLORIDE 40 MEQ: 1500 TABLET, EXTENDED RELEASE ORAL at 19:20

## 2022-09-27 RX ADMIN — IOPAMIDOL 100 ML: 755 INJECTION, SOLUTION INTRAVENOUS at 18:40

## 2022-09-27 RX ADMIN — SODIUM CHLORIDE 1000 ML: 9 INJECTION, SOLUTION INTRAVENOUS at 19:31

## 2022-09-27 RX ADMIN — POTASSIUM CHLORIDE 10 MEQ: 7.46 INJECTION, SOLUTION INTRAVENOUS at 19:33

## 2022-09-27 RX ADMIN — Medication 10 MG: at 22:51

## 2022-09-27 ASSESSMENT — ACTIVITIES OF DAILY LIVING (ADL)
ADLS_ACUITY_SCORE: 35

## 2022-09-27 NOTE — ED PROVIDER NOTES
EMERGENCY DEPARTMENT ENCOUNTER            IMPRESSION:  Gastroenteritis  Dehydration  Hypokalemia      MEDICAL DECISION MAKING:  Patient referred to emergency department for evaluation of hypokalemia.  She was seen earlier today at an outpatient clinic and found to have potassium 2.4.  She has recently returned from her overseas trip.  She reports persistent vomiting and diarrhea and decreased intake.  She has crampy abdominal pain.  No hematochezia    On exam her vital signs are normal.  She appears mildly ill.  She is dehydrated.  There is no abdominal tenderness    An IV was started and she was given symptomatic medication    EKG does not show evidence of hypokalemia QT is 470    Potassium is 2.7.  Magnesium is normal.  IV and oral potassium supplementation were ordered    CT shows evidence of enteritis    Stool cultures were collected and ordered    Disposition was discussed with the patient.  This point she feels too weak for return home.  She will be placed under observational care with the hospitalist      =================================================================  CHIEF COMPLAINT:  Chief Complaint   Patient presents with     Abnormal Labs     Diarrhea         HPI  Cici Martinez is a 67 year old year old female with a pertinent medical history of osteoporosis, anxiety, migraine who presents to the ED by personal vehicle for the evaluation of abnormal labs, diarrhea.    Per chart review, the patient presented to the [facility]rg[facility] for diarrhea. She has been in [place] for the last month and has been experiencing diarrhea for the last week. The patient's potassium was noted to be 2.4.  She was sent to the ED.    The patient presents to the ED reporting she has been having multiple episodes of diarrhea a day with fatigue, increased sleep, nausea, abdominal pain, abdominal distention, diaphoresis, chills, shortness of breath with exertion, and dizziness/lightheadedness. She also noted she vomited on the first  day of symptoms.     She has taken Imodium and Omeprazole for her symptoms.     She notes that since getting off the plane today, she has had 10-12 episodes of diarrhea.     Of note, she has been traveling in [place] and returned today.     The patient denies blood in stool, chest pain, fever, numbness, swelling, urinary symptoms, cough, congestion, and any other symptoms or complaints at this time.     MHx: Takes Zoloft. Osteoporosis.   SHx: Does not smoke or use drugs.  Occasional alcohol.        I, Jason Ohara am serving as a scribe to document services personally performed by Dr. Eduin Marin MD, based on my observation and the provider's statements to me. I, Dr. Eduin Marin MD attest that Jason Ohara is acting in a scribe capacity, has observed my performance of the services and has documented them in accordance with my direction.      REVIEW OF SYSTEMS   Constitutional: Denies fever, unintentional weight loss. Positive for fatigue, increased sleep, diaphoresis, chills.   Eyes: Denies visual changes or discharge    HENT: Denies sore throat, ear pain or neck pain or congestion.   Respiratory: Denies cough. Positive for shortness of breath with exertion.   Cardiovascular: Denies chest pain, palpitations or leg swelling  GI: Denies dark, bloody stools.  Positive for nausea, abdominal pain, abdominal distention, diarrhea, vomiting.  : Denies hematuria, dysuria, or flank pain  Musculoskeletal: Denies any new back pain or new muscle/joint pains, no swelling.  Skin: Denies rash or wound  Neurologic: Denies current headache, numbness, new weakness, focal weakness, or sensory changes. Positive for dizziness/lightheadedness.   Lymphatic: Denies swollen glands    Psychiatric: Denies depression, suicidal ideation or homicidal ideation.    Remainder of systems reviewed, unless noted in HPI all others negative.      PAST MEDICAL HISTORY:  No past medical history on file.    PAST SURGICAL HISTORY:  No past surgical  "history on file.      CURRENT MEDICATIONS:    calcium carbonate 600 mg-vitamin D 400 units (CALTRATE) 600-400 MG-UNIT per tablet  fish oil-omega-3 fatty acids 1000 MG capsule  gabapentin (NEURONTIN) 300 MG capsule  meloxicam (MOBIC) 7.5 MG tablet  multivitamin with minerals (THERA-M) 9 mg iron-400 mcg Tab tablet  omeprazole 20 MG tablet  rizatriptan (MAXALT) 10 MG tablet  sertraline (ZOLOFT) 100 MG tablet  Vitamin D, Cholecalciferol, 25 MCG (1000 UT) TABS        ALLERGIES:  Allergies   Allergen Reactions     Erythromycin Nausea and Vomiting     Prolia [Denosumab] Hives     Sulfa Drugs Nausea and Vomiting     Sumatriptan Unknown       FAMILY HISTORY:  No family history on file.    SOCIAL HISTORY:   Social History     Socioeconomic History     Marital status: Single       PHYSICAL EXAM:    /84   Pulse 90   Temp 97.5  F (36.4  C) (Temporal)   Resp 16   Ht 1.575 m (5' 2\")   Wt 59.9 kg (132 lb)   SpO2 100%   BMI 24.14 kg/m      Constitutional: Awake, she appears tired and moderately ill  Head: Normocephalic, atraumatic.  ENT: Mucous membranes dehydrated posterior oropharynx appears normal.  Eyes: Pupils midrange and reactive ,no conjunctival discharge  Neck: No lymphadenopathy, no stridor, supple, no soft tissue swelling  Chest: No tenderness   Respiratory: Respirations even, unlabored. Lungs clear to ascultation bilaterally, in no acute respiratory distress.  Cardiovascular: Regular rate and rhythm.+2 radial pulses, equal bilaterally.  No murmurs.   GI: Abdomen soft, non-tender to palpation in all 4 quadrants. No guarding or rebound. Bowel sounds intact on all 4 quadrants.   Back: No CVA tenderness.    Musculoskeletal: Moves all 4 extremities equally, strength symmetrical on bilateral uppers and lowers.  No peripheral edema  Integument: Warm, dry. No rash. No bruising or petechiae.  Lymphatic: No cervical lymphadenopathy  Neurologic: Alert & oriented x 3. Normal speech. Grossly normal motor and sensory " function. No focal deficits noted.    Psychiatric: Normal mood and affect. Normal judgement.    ED COURSE:  6:08 PM The NP student met with the patient and performed her initial exam.  She discussed the plan for care and the patient is agreeable with this plan.   8:14 PM I rechecked and updated the patient.  8:15 PM I discussed the case with hospitalist,  , who accepts the patient      LAB:  All pertinent labs reviewed and interpreted.  Results for orders placed or performed during the hospital encounter of 09/27/22   CT Abdomen Pelvis w Contrast    Impression    IMPRESSION:   1.  Fluid throughout the colon consistent with diarrheal illness. Otherwise unremarkable CT abdomen and pelvis.   Basic metabolic panel   Result Value Ref Range    Sodium 135 (L) 136 - 145 mmol/L    Potassium 2.7 (LL) 3.5 - 5.0 mmol/L    Chloride 98 98 - 107 mmol/L    Carbon Dioxide (CO2) 25 22 - 31 mmol/L    Anion Gap 12 5 - 18 mmol/L    Urea Nitrogen 10 8 - 22 mg/dL    Creatinine 0.63 0.60 - 1.10 mg/dL    Calcium 8.7 8.5 - 10.5 mg/dL    Glucose 95 70 - 125 mg/dL    GFR Estimate >90 >60 mL/min/1.73m2   Result Value Ref Range    Magnesium 2.1 1.8 - 2.6 mg/dL       RADIOLOGY:  Reviewed all pertinent imaging. Please see official radiology report.  CT Abdomen Pelvis w Contrast   Final Result   IMPRESSION:    1.  Fluid throughout the colon consistent with diarrheal illness. Otherwise unremarkable CT abdomen and pelvis.           EKG:    Time: 19:47:16  Rate: 79 BPM  QRS: 98 ms  QTC: 477 ms  Sinus rhythm. Incomplete RBBB. Borderline ECG.   No previous ECGs available.     I have independently reviewed and interpreted this ECG and agree with the above findings. See scanned document for further details.     PROCEDURES:   Critical Care Time 30 minutes excluding procedures      MEDICATIONS GIVEN IN THE EMERGENCY:  Medications   0.9% sodium chloride BOLUS (1,000 mLs Intravenous New Bag 9/27/22 1931)     Followed by   sodium chloride 0.9% infusion (has  no administration in time range)   potassium chloride 10 mEq in 100 mL sterile water intermittent infusion (premix) (10 mEq Intravenous New Bag 9/27/22 1933)   potassium chloride ER (KLOR-CON M) CR tablet 40 mEq (40 mEq Oral Given 9/27/22 1920)   iopamidol (ISOVUE-370) solution 100 mL (100 mLs Intravenous Given 9/27/22 1840)           NEW PRESCRIPTIONS STARTED AT TODAY'S ER VISIT:  New Prescriptions    No medications on file          FINAL DIAGNOSIS:    ICD-10-CM    1. Hypokalemia  E87.6    2. Gastroenteritis  K52.9             At the conclusion of the encounter I discussed the results of all of the tests and the disposition. The questions were answered. The patient or family acknowledged understanding and was agreeable with the care plan.     NAME: Cici Martinez  AGE: 67 year old female  YOB: 1955  MRN: [id number]  EVALUATION DATE & TIME: 9/27/2022  5:51 PM    PCP: Vanda Arnold    ED PROVIDER: Eduin Marin M.D.      I, Jason Ohara, am serving as a scribe to document services personally performed by Dr. Eduin Marin based on my observation and the provider's statements to me. I, Eduin Marin MD attest that Jason Ohara is acting in a scribe capacity, has observed my performance of the services and has documented them in accordance with my direction.    Eduin Marin M.D.  Emergency Medicine  [facility] EMERGENCY ROOM  [facility] [facility] [address]  [phone]  Dept: [phone]  9/27/2022       Eduin Marin MD  09/27/22 2026

## 2022-09-27 NOTE — ED NOTES
Expected Patient Referral to ED  4:43 PM    Referring Clinic/Provider:  [facility]    Reason for referral/Clinical facts:  hyokalemia 2.4. 1 month diarrhea. Traveled in [place]. SOB, weakness    Recommendations provided:  Send to ED for further evaluation    Caller was informed that this institution does possess the capabilities and/or resources to provide for patient and should be transferred to our facility.    Discussed that if direct admit is sought and any hurdles are encountered, this ED would be happy to see the patient and evaluate.    Informed caller that recommendations provided are recommendations based only on the facts provided and that they responsible to accept or reject the advice, or to seek a formal in person consultation as needed and that this ED will see/treat patient should they arrive.      Javier White MD  [facility] EMERGENCY ROOM  [address] [address] [phone]       Javier White MD  09/27/22 8424

## 2022-09-27 NOTE — ED TRIAGE NOTES
The patient presents to the ED with diarrhea for the past 8 days. The patient went to urgent care today and was sent here after finding her potassium to be 2.4. The patient had an EKG, labs and and a chest xray done prior to arrival. She has been taking immodium for diarrhea.

## 2022-09-28 LAB
ANION GAP SERPL CALCULATED.3IONS-SCNC: 13 MMOL/L (ref 5–18)
ATRIAL RATE - MUSE: 79 BPM
BUN SERPL-MCNC: 6 MG/DL (ref 8–22)
C COLI+JEJUNI+LARI FUSA STL QL NAA+PROBE: NOT DETECTED
CALCIUM SERPL-MCNC: 8.1 MG/DL (ref 8.5–10.5)
CHLORIDE BLD-SCNC: 106 MMOL/L (ref 98–107)
CO2 SERPL-SCNC: 21 MMOL/L (ref 22–31)
CREAT SERPL-MCNC: 0.59 MG/DL (ref 0.6–1.1)
DIASTOLIC BLOOD PRESSURE - MUSE: NORMAL MMHG
EC STX1 GENE STL QL NAA+PROBE: NOT DETECTED
EC STX2 GENE STL QL NAA+PROBE: NOT DETECTED
GFR SERPL CREATININE-BSD FRML MDRD: >90 ML/MIN/1.73M2
GLUCOSE BLD-MCNC: 89 MG/DL (ref 70–125)
HOLD SPECIMEN: NORMAL
INTERPRETATION ECG - MUSE: NORMAL
MAGNESIUM SERPL-MCNC: 1.9 MG/DL (ref 1.8–2.6)
NOROV GI+II ORF1-ORF2 JNC STL QL NAA+PR: NOT DETECTED
P AXIS - MUSE: 66 DEGREES
PHOSPHATE SERPL-MCNC: 1.9 MG/DL (ref 2.5–4.5)
POTASSIUM BLD-SCNC: 3.2 MMOL/L (ref 3.5–5)
POTASSIUM BLD-SCNC: 3.4 MMOL/L (ref 3.5–5)
POTASSIUM BLD-SCNC: 3.4 MMOL/L (ref 3.5–5)
PR INTERVAL - MUSE: 174 MS
QRS DURATION - MUSE: 98 MS
QT - MUSE: 416 MS
QTC - MUSE: 477 MS
R AXIS - MUSE: 8 DEGREES
RVA NSP5 STL QL NAA+PROBE: NOT DETECTED
SALMONELLA SP RPOD STL QL NAA+PROBE: NOT DETECTED
SHIGELLA SP+EIEC IPAH STL QL NAA+PROBE: NOT DETECTED
SODIUM SERPL-SCNC: 140 MMOL/L (ref 136–145)
SYSTOLIC BLOOD PRESSURE - MUSE: NORMAL MMHG
T AXIS - MUSE: 27 DEGREES
V CHOL+PARA RFBL+TRKH+TNAA STL QL NAA+PR: NOT DETECTED
VENTRICULAR RATE- MUSE: 79 BPM
Y ENTERO RECN STL QL NAA+PROBE: NOT DETECTED

## 2022-09-28 PROCEDURE — 84132 ASSAY OF SERUM POTASSIUM: CPT | Performed by: HOSPITALIST

## 2022-09-28 PROCEDURE — G0378 HOSPITAL OBSERVATION PER HR: HCPCS

## 2022-09-28 PROCEDURE — 99226 PR SUBSEQUENT OBSERVATION CARE,LEVEL III: CPT | Performed by: HOSPITALIST

## 2022-09-28 PROCEDURE — 80048 BASIC METABOLIC PNL TOTAL CA: CPT | Performed by: HOSPITALIST

## 2022-09-28 PROCEDURE — 250N000013 HC RX MED GY IP 250 OP 250 PS 637: Performed by: HOSPITALIST

## 2022-09-28 PROCEDURE — 96375 TX/PRO/DX INJ NEW DRUG ADDON: CPT

## 2022-09-28 PROCEDURE — 84100 ASSAY OF PHOSPHORUS: CPT | Performed by: HOSPITALIST

## 2022-09-28 PROCEDURE — 258N000003 HC RX IP 258 OP 636: Performed by: HOSPITALIST

## 2022-09-28 PROCEDURE — 96367 TX/PROPH/DG ADDL SEQ IV INF: CPT

## 2022-09-28 PROCEDURE — 83735 ASSAY OF MAGNESIUM: CPT | Performed by: HOSPITALIST

## 2022-09-28 PROCEDURE — 36415 COLL VENOUS BLD VENIPUNCTURE: CPT | Performed by: HOSPITALIST

## 2022-09-28 PROCEDURE — 250N000011 HC RX IP 250 OP 636: Performed by: HOSPITALIST

## 2022-09-28 RX ORDER — POTASSIUM CHLORIDE 1500 MG/1
40 TABLET, EXTENDED RELEASE ORAL ONCE
Status: COMPLETED | OUTPATIENT
Start: 2022-09-28 | End: 2022-09-28

## 2022-09-28 RX ORDER — POTASSIUM CHLORIDE 1500 MG/1
40 TABLET, EXTENDED RELEASE ORAL ONCE
Status: DISCONTINUED | OUTPATIENT
Start: 2022-09-28 | End: 2022-09-28

## 2022-09-28 RX ORDER — MAGNESIUM SULFATE HEPTAHYDRATE 40 MG/ML
2 INJECTION, SOLUTION INTRAVENOUS ONCE
Status: COMPLETED | OUTPATIENT
Start: 2022-09-28 | End: 2022-09-28

## 2022-09-28 RX ORDER — POTASSIUM CHLORIDE 20MEQ/15ML
40 LIQUID (ML) ORAL ONCE
Status: DISCONTINUED | OUTPATIENT
Start: 2022-09-28 | End: 2022-09-28 | Stop reason: CLARIF

## 2022-09-28 RX ORDER — POTASSIUM CHLORIDE 1.5 G/1.58G
40 POWDER, FOR SOLUTION ORAL ONCE
Status: COMPLETED | OUTPATIENT
Start: 2022-09-28 | End: 2022-09-28

## 2022-09-28 RX ADMIN — POTASSIUM & SODIUM PHOSPHATES POWDER PACK 280-160-250 MG 2 PACKET: 280-160-250 PACK at 17:56

## 2022-09-28 RX ADMIN — Medication 1 TABLET: at 22:13

## 2022-09-28 RX ADMIN — POTASSIUM & SODIUM PHOSPHATES POWDER PACK 280-160-250 MG 2 PACKET: 280-160-250 PACK at 11:50

## 2022-09-28 RX ADMIN — POTASSIUM CHLORIDE 40 MEQ: 1500 TABLET, EXTENDED RELEASE ORAL at 15:06

## 2022-09-28 RX ADMIN — Medication 10 MG: at 22:13

## 2022-09-28 RX ADMIN — SERTRALINE HYDROCHLORIDE 200 MG: 100 TABLET, FILM COATED ORAL at 08:01

## 2022-09-28 RX ADMIN — CHOLECALCIFEROL TAB 25 MCG (1000 UNIT) 1000 UNITS: 25 TAB at 08:02

## 2022-09-28 RX ADMIN — ONDANSETRON 4 MG: 2 INJECTION INTRAMUSCULAR; INTRAVENOUS at 15:06

## 2022-09-28 RX ADMIN — POTASSIUM & SODIUM PHOSPHATES POWDER PACK 280-160-250 MG 2 PACKET: 280-160-250 PACK at 22:13

## 2022-09-28 RX ADMIN — MULTIPLE VITAMINS W/ MINERALS TAB 1 TABLET: TAB at 08:01

## 2022-09-28 RX ADMIN — PANTOPRAZOLE SODIUM 40 MG: 20 TABLET, DELAYED RELEASE ORAL at 08:02

## 2022-09-28 RX ADMIN — SODIUM CHLORIDE: 9 INJECTION, SOLUTION INTRAVENOUS at 07:58

## 2022-09-28 RX ADMIN — POTASSIUM CHLORIDE 40 MEQ: 1.5 FOR SOLUTION ORAL at 10:43

## 2022-09-28 RX ADMIN — Medication 1 TABLET: at 08:11

## 2022-09-28 RX ADMIN — POTASSIUM CHLORIDE 40 MEQ: 1500 TABLET, EXTENDED RELEASE ORAL at 22:13

## 2022-09-28 RX ADMIN — MAGNESIUM SULFATE HEPTAHYDRATE 2 G: 40 INJECTION, SOLUTION INTRAVENOUS at 10:43

## 2022-09-28 ASSESSMENT — ACTIVITIES OF DAILY LIVING (ADL)
ADLS_ACUITY_SCORE: 31
ADLS_ACUITY_SCORE: 33
ADLS_ACUITY_SCORE: 31
ADLS_ACUITY_SCORE: 35
DEPENDENT_IADLS:: INDEPENDENT
ADLS_ACUITY_SCORE: 31
ADLS_ACUITY_SCORE: 35
ADLS_ACUITY_SCORE: 33
ADLS_ACUITY_SCORE: 35

## 2022-09-28 NOTE — ED NOTES
Pt slept well still tired, gave michelle ice water, pt states she went up to the commode at around 5 to pee.

## 2022-09-28 NOTE — PROGRESS NOTES
[facility]    Medicine Progress Note - Hospitalist Service    Date of Admission:  9/27/2022    Assessment & Plan               Cici Martinez is a 67 year old female admitted on 9/27/2022. She recently traveled to [place] and returned with now 8-days of progressively diarrhea, nausea and vomiting, and poor oral intake and found to have severe hypokalemia of K at 2.4 and to 2.7 with initial replacement. Will admit for further infectious work-up and supportive care as well as aggressive electrolyte replacement.  K is still only 3.2 despite high-level replacement and still with very little to no oral intake. At lunch, she was only able to take a few bites out of a toast without further intake. Continue IVF and supportive cares. In addition to adequate K replacement that will be sustained, she requires ongoing supportive care until she is able to maintain adequate minimum nutritional intake.     Gastroenteritis  Traveler's diarrhea  Severe hypokalemia  -High level K and mag replacement; mag replacement by IV only given diarrhea  -IVF, bolus and maintenance   -Follow-up stool culture and c diff testing  -Order and check procalcitonin and CRP  -If not improved consider azithromycin or cipro    Migraine  -Order home PRN for symptomatic relief.         Diet: Combination Diet Regular Diet Adult    DVT Prophylaxis: Low Risk/Ambulatory with no VTE prophylaxis indicated, Pneumatic Compression Devices, Anti-embolisim stockings (TEDs) and Ambulate every shift  Stearns Catheter: Not present  Central Lines: None  Cardiac Monitoring: None  Code Status: Full Code      Disposition Plan      Expected Discharge Date: 09/29/2022                The patient's care was discussed with the Patient.    Juan Francisco Garza MD  Hospitalist Service  [facility]  Securely message with the Vocera Web Console (learn more here)  Text page via [email or web address] Paging/Directory         Clinically Significant Risk Factors  Present on Admission                          ______________________________________________________________________    Interval History   No acute events overnight.    Loose stools, not forming yet. Still with very little to no oral intake. At lunch, she was only able to take a few bites out of a toast without further intake. No report of chest pain, shortness of breath, or other new complaints. Discussed plan of care with patient. Answered all questions to patient's verbalized understanding and satisfaction.    Data reviewed today: I reviewed all medications, new labs and imaging results over the last 24 hours. I personally reviewed c diff test is negative, imaging CT abdomen, labs    Physical Exam   Vital Signs: Temp: 98.6  F (37  C) Temp src: Oral BP: 116/73 Pulse: 85   Resp: 15 SpO2: 99 % O2 Device: None (Room air)    Weight: 132 lbs 0 oz  GENERAL:  Alert, appears comfortable, in no acute distress, appears stated age   HEAD:  Normocephalic, without obvious abnormality, atraumatic   EYES:  PERRL, conjunctiva/corneas clear, no scleral icterus, EOM's intact   NOSE: Nares normal, septum midline, mucosa normal, no drainage   THROAT: Lips, mucosa, and tongue normal; teeth and gums normal, mouth bit dry still   NECK: Supple, symmetrical, trachea midline   BACK:   Symmetric, no curvature, ROM normal   LUNGS:   Clear to auscultation bilaterally, no rales, rhonchi, or wheezing, symmetric chest rise on inhalation, respirations unlabored   CHEST WALL:  No tenderness or deformity   HEART:  Regular rate and rhythm, S1 and S2 normal, no murmur, rub, or gallop    ABDOMEN:   Soft, non-tender, bowel sounds active all four quadrants, no masses, no organomegaly, no rebound or guarding   EXTREMITIES: Extremities normal, atraumatic, no cyanosis or edema    SKIN: Dry to touch, no exanthems in the visualized areas   NEURO: Alert, oriented x 4, moves all four extremities freely/spontaneously   PSYCH: Cooperative, behavior is  appropriate      Data   Recent Labs   Lab 09/28/22  0849 09/27/22  2159 09/27/22  1825   WBC  --  4.0  --    HGB  --  11.5*  --    MCV  --  84  --    PLT  --  210  --      --  135*   POTASSIUM 3.2*  --  2.7*   CHLORIDE 106  --  98   CO2 21*  --  25   BUN 6*  --  10   CR 0.59*  --  0.63   ANIONGAP 13  --  12   YAA 8.1*  --  8.7   GLC 89  --  95     Recent Results (from the past 24 hour(s))   CT Abdomen Pelvis w Contrast    Narrative    EXAM: CT ABDOMEN PELVIS W CONTRAST  LOCATION: [facility]  DATE/TIME: 9/27/2022 6:44 PM    INDICATION: NVD.  COMPARISON: None.  TECHNIQUE: CT scan of the abdomen and pelvis was performed following injection of IV contrast. Multiplanar reformats were obtained. Dose reduction techniques were used.  CONTRAST: Isovue-370 100 mL    FINDINGS:   LOWER CHEST: Normal.    HEPATOBILIARY: Small cyst in the left hepatic lobe. No suspicious liver mass. No calcified gallstones.    PANCREAS: Normal.    SPLEEN: Normal.    ADRENAL GLANDS: Normal.    KIDNEYS/BLADDER: Normal.    BOWEL: There is fluid throughout the colon consistent with diarrheal illness. No bowel obstruction or inflammatory change.    LYMPH NODES: Normal.    VASCULATURE: Unremarkable.    PELVIC ORGANS: Normal.    MUSCULOSKELETAL: Unremarkable.      Impression    IMPRESSION:   1.  Fluid throughout the colon consistent with diarrheal illness. Otherwise unremarkable CT abdomen and pelvis.     Medications     sodium chloride Stopped (09/27/22 2103)     sodium chloride 100 mL/hr at 09/28/22 0758       calcium carbonate-vitamin D  1 tablet Oral BID     melatonin  10 mg Oral At Bedtime     multivitamin w/minerals  1 tablet Oral Daily     pantoprazole  40 mg Oral Daily     sertraline  200 mg Oral Daily     sodium chloride (PF)  3 mL Intracatheter Q8H     Vitamin D3  1,000 Units Oral Daily

## 2022-09-28 NOTE — H&P
[facility]    History and Physical - Hospitalist Service       Date of Admission:  9/27/2022    Assessment & Plan      Cici Martinez is a 67 year old female admitted on 9/27/2022. She recently traveled to [place] and returned with now 8-days of progressively diarrhea, nausea and vomiting, and poor oral intake and found to have severe hypokalemia of K at 2.4 and to 2.7 with initial replacement. Will admit for further infectious work-up and supportive care as well as aggressive electrolyte replacement.      Gastroenteritis  Traveler's diarrhea  Severe hypokalemia  -High level K and mag replacement; mag replacement by IV only given diarrhea  -IVF, bolus and maintenance   -Follow-up stool culture and c diff testing  -Order and check procalcitonin and CRP  -If not improved consider azithromycin or cipro    Migraine  -Order home PRN for symptomatic relief.       Diet: Combination Diet Regular Diet Adult    DVT Prophylaxis: Low Risk/Ambulatory with no VTE prophylaxis indicated, Pneumatic Compression Devices, Anti-embolisim stockings (TEDs) and Ambulate every shift  Stearns Catheter: Not present  Central Lines: None  Cardiac Monitoring: None  Code Status: Full Code      Clinically Significant Risk Factors Present on Admission        # Hypokalemia: K = 2.7 mmol/L (Ref range: 3.5 - 5.0 mmol/L) on admission, will replace as needed                   Disposition Plan      Expected Discharge Date: 09/28/2022                The patient's care was discussed with the Patient.    Juan Francisco Garza MD  Hospitalist Service  [facility]  Securely message with the Vocera Web Console (learn more here)  Text page via [employer] Paging/Directory         ______________________________________________________________________    Chief Complaint   Diarrhea, abdominal pain    History is obtained from the patient    History of Present Illness   Cici Martinez is a 67 year old female admitted on 9/27/2022.  She is admitted for severe hypokalemia.     She recently traveled to [place] and returned with now 8-days of progressively diarrhea, nausea and vomiting, and poor oral intake and found to have severe hypokalemia of K at 2.4 and to 2.7 with initial replacement. No fever. NO chest pain or SOB. No bleeding or BRBPR or bloody diarrhea. Otherwise, there is no endorsement of any fevers, rigors, chest pain or shortness of breath, changes in bowel movements/pattern, urinary symptoms, focal weakness, or any other new and associated symptoms at this time. 14 point review of systems performed without any other pertinent positives at this time.     The social history, family history, and past medical history were directly reviewed. All questions the patient had at this time were answered to verbalized and stated satisfaction and understanding. Code status was discussed and the patient confirmed wishes for full code for this hospitalization.    Review of Systems    The 10 point Review of Systems is negative other than noted in the HPI or here.     Past Medical History    I have reviewed this patient's medical history and updated it with pertinent information if needed.   No past medical history on file.    Past Surgical History   I have reviewed this patient's surgical history and updated it with pertinent information if needed.  No past surgical history on file.    Social History   I have reviewed this patient's social history and updated it with pertinent information if needed.       Family History   No family history of inherited or genetic conditions endorsed/reporte.d     Prior to Admission Medications   Prior to Admission Medications   Prescriptions Last Dose Informant Patient Reported? Taking?   Vitamin D, Cholecalciferol, 25 MCG (1000 UT) TABS Past Week at Unknown time  Yes Yes   Sig: Take 1,000 Units by mouth daily   calcium carbonate 600 mg-vitamin D 400 units (CALTRATE) 600-400 MG-UNIT per tablet Past Week at Unknown  time  Yes Yes   Sig: Take 1 tablet by mouth 2 times daily   fish oil-omega-3 fatty acids 1000 MG capsule Past Week at Unknown time  Yes Yes   Sig: Take 1 g by mouth daily   gabapentin (NEURONTIN) 300 MG capsule Past Month at Unknown time  Yes Yes   Sig: Take 300 mg by mouth 2 times daily as needed   meloxicam (MOBIC) 7.5 MG tablet Past Month at Unknown time  Yes Yes   Sig: Take 7.5 mg by mouth 2 times daily as needed   multivitamin with minerals (THERA-M) 9 mg iron-400 mcg Tab tablet Past Week at Unknown time  Yes Yes   Sig: [MULTIVITAMIN WITH MINERALS (THERA-M) 9 MG IRON-400 MCG TAB TABLET] Take 1 tablet by mouth daily.   omeprazole 20 MG tablet 9/27/2022 at Unknown time  Yes Yes   Sig: Take 20 mg by mouth daily   rizatriptan (MAXALT) 10 MG tablet Unknown at Unknown time  Yes Yes   Sig: Take 10 mg by mouth daily as needed For migraine, may repeat in 2 hours. Max of 20 mg in 24 hours.   sertraline (ZOLOFT) 100 MG tablet 9/26/2022 at schedule for 9/28  Yes Yes   Sig: Take 200 mg by mouth daily      Facility-Administered Medications: None     Allergies   Allergies   Allergen Reactions     Erythromycin Nausea and Vomiting     Prolia [Denosumab] Hives     Sulfa Drugs Nausea and Vomiting     Sumatriptan Unknown       Physical Exam   Vital Signs: Temp: 97.5  F (36.4  C) Temp src: Temporal BP: 117/84 Pulse: 90   Resp: 16 SpO2: 100 % O2 Device: None (Room air)    Weight: 132 lbs 0 oz    GENERAL:  Alert, appears comfortable, in no acute distress, appears stated age   HEAD:  Normocephalic, without obvious abnormality, atraumatic   EYES:  PERRL, conjunctiva/corneas clear, no scleral icterus, EOM's intact   NOSE: Nares normal, septum midline, mucosa normal, no drainage   THROAT: Lips, mucosa, and tongue normal; teeth and gums normal, mouth dry   NECK: Supple, symmetrical, trachea midline   BACK:   Symmetric, no curvature, ROM normal   LUNGS:   Clear to auscultation bilaterally, no rales, rhonchi, or wheezing, symmetric chest  rise on inhalation, respirations unlabored   CHEST WALL:  No tenderness or deformity   HEART:  Regular rate and rhythm, S1 and S2 normal, no murmur, rub, or gallop    ABDOMEN:   Soft, non-tender, bowel sounds hypoactive all four quadrants, no masses, no organomegaly, no rebound or guarding   EXTREMITIES: Extremities normal, atraumatic, no cyanosis or edema    SKIN: Dry to touch, no exanthems in the visualized areas   NEURO: Alert, oriented x 4, moves all four extremities freely/spontaneously   PSYCH: Cooperative, behavior is appropriate      Data   Data reviewed today: I reviewed all medications, new labs and imaging results over the last 24 hours. I personally reviewed the EKG tracing showing snr. NSR, IVCD/incomplete RBBB.    Recent Labs   Lab 09/27/22  1825   *   POTASSIUM 2.7*   CHLORIDE 98   CO2 25   BUN 10   CR 0.63   ANIONGAP 12   YAA 8.7   GLC 95     Recent Results (from the past 24 hour(s))   CT Abdomen Pelvis w Contrast    Narrative    EXAM: CT ABDOMEN PELVIS W CONTRAST  LOCATION: [facility]  DATE/TIME: 9/27/2022 6:44 PM    INDICATION: NVD.  COMPARISON: None.  TECHNIQUE: CT scan of the abdomen and pelvis was performed following injection of IV contrast. Multiplanar reformats were obtained. Dose reduction techniques were used.  CONTRAST: Isovue-370 100 mL    FINDINGS:   LOWER CHEST: Normal.    HEPATOBILIARY: Small cyst in the left hepatic lobe. No suspicious liver mass. No calcified gallstones.    PANCREAS: Normal.    SPLEEN: Normal.    ADRENAL GLANDS: Normal.    KIDNEYS/BLADDER: Normal.    BOWEL: There is fluid throughout the colon consistent with diarrheal illness. No bowel obstruction or inflammatory change.    LYMPH NODES: Normal.    VASCULATURE: Unremarkable.    PELVIC ORGANS: Normal.    MUSCULOSKELETAL: Unremarkable.      Impression    IMPRESSION:   1.  Fluid throughout the colon consistent with diarrheal illness. Otherwise unremarkable CT abdomen and pelvis.

## 2022-09-28 NOTE — PHARMACY-ADMISSION MEDICATION HISTORY
Pharmacy Note - Admission Medication History    Pertinent Provider Information:     Patient started taking omeprazole while in [place] and has been holding her meloxicam with her current symptoms.      ______________________________________________________________________    Prior To Admission (PTA) med list completed and updated in EMR.       PTA Med List   Medication Sig Last Dose     calcium carbonate 600 mg-vitamin D 400 units (CALTRATE) 600-400 MG-UNIT per tablet Take 1 tablet by mouth 2 times daily Past Week at Unknown time     fish oil-omega-3 fatty acids 1000 MG capsule Take 1 g by mouth daily Past Week at Unknown time     gabapentin (NEURONTIN) 300 MG capsule Take 300 mg by mouth 2 times daily as needed Past Month at Unknown time     meloxicam (MOBIC) 7.5 MG tablet Take 7.5 mg by mouth 2 times daily as needed Past Month at Unknown time     multivitamin with minerals (THERA-M) 9 mg iron-400 mcg Tab tablet [MULTIVITAMIN WITH MINERALS (THERA-M) 9 MG IRON-400 MCG TAB TABLET] Take 1 tablet by mouth daily. Past Week at Unknown time     omeprazole 20 MG tablet Take 20 mg by mouth daily 9/27/2022 at Unknown time     rizatriptan (MAXALT) 10 MG tablet Take 10 mg by mouth daily as needed For migraine, may repeat in 2 hours. Max of 20 mg in 24 hours. Unknown at Unknown time     sertraline (ZOLOFT) 100 MG tablet Take 200 mg by mouth daily 9/26/2022 at schedule for 9/28     Vitamin D, Cholecalciferol, 25 MCG (1000 UT) TABS Take 1,000 Units by mouth daily Past Week at Unknown time       Information source(s): Patient and CareEverywhere/[facility]  Method of interview communication: in-person    Summary of Changes to PTA Med List  New: meloxicam, sertraline, gabapentin, rizatriptan 10 mg, fish oil, calcium + D, vitamin D, omeprazole  Discontinued: rizatriptan 5 mg  Changed: none    Patient was asked about OTC/herbal products specifically.  PTA med list reflects this.    In the past week, patient estimated taking medication  this percent of the time:  50-90% due to illness.    Allergies were reviewed, assessed, and updated with the patient.      Patient does not use any multi-dose medications prior to admission.    The information provided in this note is only as accurate as the sources available at the time of the update(s).    Thank you for the opportunity to participate in the care of this patient.    Lynda Suarez [facility]  9/27/2022 7:09 PM

## 2022-09-28 NOTE — PROGRESS NOTES
Patient arrived from ED. Nausea present, zofran administered. A&O and CMS is intact. Admission charting completed and patient is comfortable in room.  Daphne Cherry RN

## 2022-09-28 NOTE — CONSULTS
Care Management Initial Consult    General Information  Assessment completed with: Patient,    Type of CM/SW Visit: Offer D/C Planning    Primary Care Provider verified and updated as needed: Yes   Readmission within the last 30 days: no previous admission in last 30 days   Advance Care Planning: Advance Care Planning Reviewed:  (does not have a HCD, not interested in doing one at this time, verbally states her sister Maryjane Martinez should make medical decisions for her IF she were not able to make them herself.)     Communication Assessment  Patient's communication style: spoken language (English or Bilingual)    Cognitive  Cognitive/Neuro/Behavioral: alert and appropriately oriented                        Living Environment:   People in home: sibling(s)  sister Maryjane  Current living Arrangements: house (share homes half the year in [place] and half the year in [place])      Able to return to prior arrangements:  yes       Family/Social Support:  Care provided by: self  Provides care for: no one  Marital Status:   Children, Sibling(s) (sister Maryjane and 1 son Grady)          Description of Support System: Supportive, Involved         Current Resources:   Patient receiving home care services: No     Community Resources:  ( [facility] once yearly)  Equipment currently used at home:    Supplies currently used at home: None    Employment/Financial:  Employment Status: retired     Employment/ Comments: no  or [employer]  Financial Concerns: No concerns identified   Referral to Financial Worker: No       Lifestyle & Psychosocial Needs:  Social Determinants of Health     Tobacco Use: Medium Risk     Smoking Tobacco Use: Former Smoker     Smokeless Tobacco Use: Unknown   Alcohol Use: Not on file   Financial Resource Strain: Not on file   Food Insecurity: Not on file   Transportation Needs: Not on file   Physical Activity: Not on file   Stress: Not on file   Social Connections: Not on file    Intimate Partner Violence: Not on file   Depression: Not on file   Housing Stability: Not on file       Functional Status:  Prior to admission patient needed assistance:   Dependent ADLs:: Independent  Dependent IADLs:: Independent       Mental Health Status:  Mental Health Status: No Current Concerns       Chemical Dependency Status:  Chemical Dependency Status: No Current Concerns             Values/Beliefs:  Spiritual, Cultural Beliefs, Yarsanism Practices, Values that affect care: no               Additional Information:  Chart reviewed. Admission under observation status. IVF and replacement. Labs.     CM provided MOON notice and education. Has Medicare and [place]BS of [place] supplement. Patient verbalized understanding and signed. Original in chart and copy to patient.     Patient and sister live together- they share a home part time in [place] and another home part time in [place]. She is independent with all cares, including driving. PCP confirmed. No HC services. [facility].     Anticipate return home without additional needs. Sister will transport.     Marlene Kelley RN

## 2022-09-29 VITALS
WEIGHT: 135.2 LBS | OXYGEN SATURATION: 99 % | DIASTOLIC BLOOD PRESSURE: 83 MMHG | HEART RATE: 89 BPM | HEIGHT: 55 IN | RESPIRATION RATE: 18 BRPM | TEMPERATURE: 98.2 F | BODY MASS INDEX: 31.29 KG/M2 | SYSTOLIC BLOOD PRESSURE: 141 MMHG

## 2022-09-29 LAB
MAGNESIUM SERPL-MCNC: 2.2 MG/DL (ref 1.8–2.6)
PHOSPHATE SERPL-MCNC: 2.2 MG/DL (ref 2.5–4.5)
POTASSIUM BLD-SCNC: 3.8 MMOL/L (ref 3.5–5)

## 2022-09-29 PROCEDURE — 36415 COLL VENOUS BLD VENIPUNCTURE: CPT | Performed by: HOSPITALIST

## 2022-09-29 PROCEDURE — 258N000003 HC RX IP 258 OP 636: Performed by: HOSPITALIST

## 2022-09-29 PROCEDURE — G0378 HOSPITAL OBSERVATION PER HR: HCPCS

## 2022-09-29 PROCEDURE — 84132 ASSAY OF SERUM POTASSIUM: CPT | Performed by: HOSPITALIST

## 2022-09-29 PROCEDURE — 84100 ASSAY OF PHOSPHORUS: CPT | Performed by: HOSPITALIST

## 2022-09-29 PROCEDURE — 83735 ASSAY OF MAGNESIUM: CPT | Performed by: HOSPITALIST

## 2022-09-29 PROCEDURE — 250N000013 HC RX MED GY IP 250 OP 250 PS 637: Performed by: HOSPITALIST

## 2022-09-29 PROCEDURE — 99217 PR OBSERVATION CARE DISCHARGE: CPT | Performed by: HOSPITALIST

## 2022-09-29 RX ORDER — POTASSIUM CHLORIDE 1500 MG/1
20 TABLET, EXTENDED RELEASE ORAL ONCE
Status: COMPLETED | OUTPATIENT
Start: 2022-09-29 | End: 2022-09-29

## 2022-09-29 RX ORDER — POTASSIUM CHLORIDE 1500 MG/1
20 TABLET, EXTENDED RELEASE ORAL DAILY
Qty: 3 TABLET | Refills: 0 | Status: SHIPPED | OUTPATIENT
Start: 2022-09-29 | End: 2022-10-02

## 2022-09-29 RX ADMIN — Medication 1 TABLET: at 08:39

## 2022-09-29 RX ADMIN — CHOLECALCIFEROL TAB 25 MCG (1000 UNIT) 1000 UNITS: 25 TAB at 08:39

## 2022-09-29 RX ADMIN — SODIUM CHLORIDE: 9 INJECTION, SOLUTION INTRAVENOUS at 03:05

## 2022-09-29 RX ADMIN — POTASSIUM & SODIUM PHOSPHATES POWDER PACK 280-160-250 MG 1 PACKET: 280-160-250 PACK at 03:14

## 2022-09-29 RX ADMIN — SERTRALINE HYDROCHLORIDE 200 MG: 100 TABLET, FILM COATED ORAL at 08:39

## 2022-09-29 RX ADMIN — PANTOPRAZOLE SODIUM 40 MG: 20 TABLET, DELAYED RELEASE ORAL at 08:39

## 2022-09-29 RX ADMIN — MULTIPLE VITAMINS W/ MINERALS TAB 1 TABLET: TAB at 08:39

## 2022-09-29 RX ADMIN — POTASSIUM & SODIUM PHOSPHATES POWDER PACK 280-160-250 MG 1 PACKET: 280-160-250 PACK at 08:39

## 2022-09-29 RX ADMIN — POTASSIUM CHLORIDE 20 MEQ: 1500 TABLET, EXTENDED RELEASE ORAL at 03:13

## 2022-09-29 ASSESSMENT — ACTIVITIES OF DAILY LIVING (ADL)
ADLS_ACUITY_SCORE: 31

## 2022-09-29 NOTE — DISCHARGE SUMMARY
[facility]  Hospitalist Discharge Summary      Date of Admission:  9/27/2022  Date of Discharge:  9/29/2022  9:05 AM  Discharging Provider: Juan Francisco Garza MD  Discharge Service: Hospitalist Service    Discharge Diagnoses   Gastroenteritis, presumed viral  Traveler's diarrhea  Severe hypokalemia  Migraine  Hypophosphatemia        Follow-ups Needed After Discharge   Follow-up Appointments     Follow-up and recommended labs and tests      Follow up with primary care provider, Vanda Arnold, within 5-7 days for   hospital follow- up.  The following labs/tests are recommended: Repeat   Basic Metabolic Panel (includes potassium) and Phosphorous Level within   5-7 days.                Unresulted Labs Ordered in the Past 30 Days of this Admission     No orders found from 8/28/2022 to 9/28/2022.      These results will be followed up by NA    Discharge Disposition   Discharged to home  Condition at discharge: Good      Hospital Course      Cici Martinez is a 67 year old female admitted on 9/27/2022. She recently traveled to [place] and returned with now 8-days of diarrhea, nausea and vomiting, and poor oral intake and found to have severe hypokalemia of K at 2.4 and to 2.7 with initial replacement and later hypophosphatemia with phos of 1.9. She was admitted for further infectious work-up and supportive care as well as aggressive electrolyte replacement.    Supportive care with IVF and nausea control was provided, and diet advanced as tolerated. C diff testing was negative. Enteric panel including campylobacter, slamonella, shigella, vibrio, rotavirus, shiga toxin 1, shiga toxin 2, norovirus I, norovirus II, and yersina enterocolitica were all not detected and all negative. Procalcitonin was not elevated.     She was tolerating a regular diet and with improved symptoms and normalized/improving electrolyte labs on day of discharge. She did not wait for further discussion regarding lab results  (negative infectious work-up) and follow-up plan, but RN did go over AVS in detail to her stated understanding.    (Patient was called to discuss all lab results and answer any and all questions she may have had; her first contact was called when she did not initially pick-up her cell phone. Upon a second attempt, she did pick-up and it was clearly communicated to her that all of her infectious work-up here was negative and that her hypokalemia and hypophosphatemia improved but will require ongoing supplementation and follow-up with PCP as well as follow-up lab testing. We discussed PCP follow-up and when a GI consultation may or may not be appropriate. We discussed that any pending tests that result positive would be communicated to her if that occurs. We discussed supportive cares further including loperamide PRN which is available OTC. All questions the patient had were thoroughly answered. She volunteered her thoughts that she was most frustrated with the lack of ability to comfortably sleep well at the hospital throughout her stay, and notably most of her hospitalization she was admitted and boarded in the ED due to the current pandemic and lack of available inpatient beds. She detailed that constant interruptions and beeping sounds kept her awake and she would not stay at the hospital any longer. She was thanked for the opportunity to care for her and to provide supportive care for a presumed viral resolving/improving gastroenteritis and to correct and normalize her severe hypokalemia and hypophosphatemia. Follow-up was again emphasized.)    Consultations This Hospital Stay   CARE MANAGEMENT / SOCIAL WORK IP CONSULT    Code Status   Full Code    Time Spent on this Encounter   I, Juan Francisco Garza MD, personally saw the patient today and spent greater than 30 minutes discharging this patient.       Juan Francisco Garza MD  [facility] [address]  [address]  "[id number]  Phone: [phone]  Fax: [phone]  ______________________________________________________________________    Physical Exam   Vital Signs: Temp: 97.7  F (36.5  C) Temp src: Axillary BP: 107/72 Pulse: 74   Resp: 16 SpO2: 97 % O2 Device: None (Room air)    Weight: 135 lbs 3.2 oz  GENERAL:  Alert, appears comfortable, in no acute distress, appears stated age   HEAD:  Normocephalic, without obvious abnormality, atraumatic   EYES:  PERRL, conjunctiva/corneas clear, no scleral icterus, EOM's intact   NOSE: Nares normal, septum midline, mucosa normal, no drainage   THROAT: Lips, mucosa, and tongue normal; teeth and gums normal, mouth moist   NECK: Supple, symmetrical, trachea midline   BACK:   Symmetric, no curvature, ROM normal   LUNGS:   Clear to auscultation bilaterally, no rales, rhonchi, or wheezing, symmetric chest rise on inhalation, respirations unlabored   CHEST WALL:  No tenderness or deformity   HEART:  Regular rate and rhythm, S1 and S2 normal, no murmur, rub, or gallop    ABDOMEN:   Soft, non-tender, bowel sounds active all four quadrants, no masses, no organomegaly, no rebound or guarding   EXTREMITIES: Extremities normal, atraumatic, no cyanosis or edema    SKIN: Dry to touch, no exanthems in the visualized areas   NEURO: Alert, oriented x 4, moves all four extremities freely/spontaneously   PSYCH: Cooperative, behavior is appropriate         Primary Care Physician   Vanda Arnold    Discharge Orders      Reason for your hospital stay    Severe hypokalemia (very low potassium level) and severe hypophosphatemia (very low phosphorous level) in the setting of resolving gastroenteritis (\"stomach bug\" likely viral from traveling) with diarrhea and nausea/vomiting.     Follow-up and recommended labs and tests    Follow up with primary care provider, Vanda Arnold, within 5-7 days for hospital follow- up.  The following labs/tests are recommended: Repeat Basic Metabolic Panel (includes potassium) and " Phosphorous Level within 5-7 days.     Activity    Your activity upon discharge: activity as tolerated and no driving for today     Diet    Follow this diet upon discharge: Orders Placed This Encounter      Combination Diet Regular Diet Adult       Significant Results and Procedures   Most Recent 3 CBC's:Recent Labs   Lab Test 09/27/22  2159   WBC 4.0   HGB 11.5*   MCV 84        Most Recent 3 BMP's:Recent Labs   Lab Test 09/29/22  0225 09/28/22 2021 09/28/22  1427 09/28/22  0849 09/27/22  1825 06/29/22  1548   NA  --   --   --  140 135* 140   POTASSIUM 3.8 3.4* 3.4* 3.2* 2.7* 3.9   CHLORIDE  --   --   --  106 98 102   CO2  --   --   --  21* 25 24   BUN  --   --   --  6* 10 15.9   CR  --   --   --  0.59* 0.63 0.76   ANIONGAP  --   --   --  13 12 14   YAA  --   --   --  8.1* 8.7 9.9   GLC  --   --   --  89 95 94     Most Recent 2 LFT's:Recent Labs   Lab Test 06/29/22  1548 06/28/21  1212   AST 22 19   ALT 19 14   ALKPHOS 67 66   BILITOTAL 0.4 0.7   ,   Results for orders placed or performed during the hospital encounter of 09/27/22   CT Abdomen Pelvis w Contrast    Narrative    EXAM: CT ABDOMEN PELVIS W CONTRAST  LOCATION: [facility]  DATE/TIME: 9/27/2022 6:44 PM    INDICATION: NVD.  COMPARISON: None.  TECHNIQUE: CT scan of the abdomen and pelvis was performed following injection of IV contrast. Multiplanar reformats were obtained. Dose reduction techniques were used.  CONTRAST: Isovue-370 100 mL    FINDINGS:   LOWER CHEST: Normal.    HEPATOBILIARY: Small cyst in the left hepatic lobe. No suspicious liver mass. No calcified gallstones.    PANCREAS: Normal.    SPLEEN: Normal.    ADRENAL GLANDS: Normal.    KIDNEYS/BLADDER: Normal.    BOWEL: There is fluid throughout the colon consistent with diarrheal illness. No bowel obstruction or inflammatory change.    LYMPH NODES: Normal.    VASCULATURE: Unremarkable.    PELVIC ORGANS: Normal.    MUSCULOSKELETAL: Unremarkable.      Impression     IMPRESSION:   1.  Fluid throughout the colon consistent with diarrheal illness. Otherwise unremarkable CT abdomen and pelvis.       Discharge Medications   Current Discharge Medication List      START taking these medications    Details   potassium chloride ER (KLOR-CON M) 20 MEQ CR tablet Take 1 tablet (20 mEq) by mouth daily for 3 days  Qty: 3 tablet, Refills: 0    Associated Diagnoses: Hypokalemia; Traveler's diarrhea      potassium phosphate, monobasic, (K-PHOS) 500 MG tablet Take 1 tablet (500 mg) by mouth 2 times daily for 3 days  Qty: 6 tablet, Refills: 0    Associated Diagnoses: Traveler's diarrhea; Hypophosphatemia         CONTINUE these medications which have NOT CHANGED    Details   calcium carbonate 600 mg-vitamin D 400 units (CALTRATE) 600-400 MG-UNIT per tablet Take 1 tablet by mouth 2 times daily      fish oil-omega-3 fatty acids 1000 MG capsule Take 1 g by mouth daily      gabapentin (NEURONTIN) 300 MG capsule Take 300 mg by mouth 2 times daily as needed      meloxicam (MOBIC) 7.5 MG tablet Take 7.5 mg by mouth 2 times daily as needed      multivitamin with minerals (THERA-M) 9 mg iron-400 mcg Tab tablet [MULTIVITAMIN WITH MINERALS (THERA-M) 9 MG IRON-400 MCG TAB TABLET] Take 1 tablet by mouth daily.      omeprazole 20 MG tablet Take 20 mg by mouth daily      rizatriptan (MAXALT) 10 MG tablet Take 10 mg by mouth daily as needed For migraine, may repeat in 2 hours. Max of 20 mg in 24 hours.      sertraline (ZOLOFT) 100 MG tablet Take 200 mg by mouth daily      Vitamin D, Cholecalciferol, 25 MCG (1000 UT) TABS Take 1,000 Units by mouth daily           Allergies   Allergies   Allergen Reactions     Erythromycin Nausea and Vomiting     Prolia [Denosumab] Hives     Sulfa Drugs Nausea and Vomiting     Sumatriptan Unknown

## 2022-09-29 NOTE — PLAN OF CARE
"PRIMARY DIAGNOSIS: GASTROENTERITIS    OUTPATIENT/OBSERVATION GOALS TO BE MET BEFORE DISCHARGE  1. Orthostatic performed: No  Pt denies dizziness/lightheadedness/other symptoms at this time, monitoring q 4hr vitals per orders r/t observation.    2. Tolerating PO fluid and/or antibiotics (if applicable): Yes  Pt reports \"low appetite\" and \"nothing seems to go down so well\" but denied nausea and reported no emesis yet this shift.  3. Nausea/Vomiting/Diarrhea symptoms improved: No,  frequent and watery.    4. Pain status: Pt denies pain.    5. Return to near baseline physical activity: Yes  Pt now independent in room, calls appropriately for assist. Displays steady gait.    Discharge Planner Nurse   Safe discharge environment identified: Yes  Barriers to discharge: Yes Pt still having frequent, watery diarrhea, monitoring labs/electrolytes.       Entered by: Maggie Hoffmann RN 09/28/2022 7:33 PM     Please review provider order for any additional goals.   Nurse to notify provider when observation goals have been met and patient is ready for discharge.                            "

## 2022-09-29 NOTE — DISCHARGE INSTRUCTIONS
Vanda Arnold MD    General - Family Medicine    [phone]   It is recommended to follow up with your primary care physician in 7 days

## 2022-09-29 NOTE — PROGRESS NOTES
Care Management Discharge Note    Discharge Date: 09/29/2022       Discharge Disposition: Home    Discharge Services: None    Discharge DME: None    Discharge Transportation: family or friend will provide (sister will transport)    Private pay costs discussed: Not applicable    PAS Confirmation Code:  NA  Patient/family educated on Medicare website which has current facility and service quality ratings:  (N/A)    Education Provided on the Discharge Plan:  Per treatment team  Persons Notified of Discharge Plans: patient  Patient/Family in Agreement with the Plan: yes    Handoff Referral Completed: not applicable    Additional Information:  SW reviewed chart.  Pt plans to discharge to prior living environment.  No CM needs identified.  Family to transport.         YANIRA Alexander

## 2022-09-29 NOTE — PROGRESS NOTES
"PRIMARY DIAGNOSIS: GASTROENTERITIS     OUTPATIENT/OBSERVATION GOALS TO BE MET BEFORE DISCHARGE  1. Orthostatic performed: No  Pt denies dizziness/lightheadedness/other symptoms at this time, monitoring q 4hr vitals per orders r/t observation.     2. Tolerating PO fluid and/or antibiotics (if applicable): Yes  Pt reports \"low appetite\" and \"nothing seems to go down so well\" but denied nausea and reported no emesis for remainder of shift.  3. Nausea/Vomiting/Diarrhea symptoms improved: No,  frequent and watery.     4. Pain status: Pt denies pain.     5. Return to near baseline physical activity: Yes  Pt now independent in room, calls appropriately for assist. Displays steady gait.    Pt continues K, Mag, phos protocols; recheck Mag in AM, recheck K and phos at 0215 9/29.    Discharge Planner Nurse   Safe discharge environment identified: Yes  Barriers to discharge: Yes Pt still having frequent, watery diarrhea, monitoring labs/electrolytes.      Entered by: Maggie Hoffmann RN 09/28/2022 10:54 PM  Please review provider order for any additional goals.   Nurse to notify provider when observation goals have been met and patient is ready for discharge.     "

## 2022-09-29 NOTE — PLAN OF CARE
"Pt cleared for discharge per [facility]. Removed PIV, assisted in collecting pt's belongings & reviewed AVS to pt's satisfaction & understanding. Escorted pt to the main entrance & discharge pt to home, accompanied by her sister.     ** Pt did voice dissatisfaction regarding the inconclusive lab/microbiology/stool culture results. Pt verbalizes how upsetting it was to not have answers. Acknowledged her frustrations & asked if speaking to MD would help. Pt declined speaking to the MD & said, \"If he finds out anything, he can email me.\" Strongly encouraged pt to follow up w/ her primary care doctor for further work up.    Lianet Arshad, BSN  Shift: 0700 - 1930  "

## 2022-10-03 ENCOUNTER — PATIENT OUTREACH (OUTPATIENT)
Dept: CARE COORDINATION | Facility: CLINIC | Age: 67
End: 2022-10-03

## 2022-10-03 NOTE — PROGRESS NOTES
Clinic Care Coordination Contact  Care Team Conversations    Patient identified for care management outreach, however patient is not on a value based contract so cannot complete outreach. Will escalate to clinic staff if specific needs or resources are indicated.    Fannie Romero [facility]  Social Work Care Coordinator - [facility]  Care Coordination  [email or web address] - [email or web address]  Cell Phone: [phone]  Gender pronouns: she/her  Employed by [employer]

## 2022-10-05 ENCOUNTER — LAB REQUISITION (OUTPATIENT)
Dept: LAB | Facility: CLINIC | Age: 67
End: 2022-10-05
Payer: MEDICARE

## 2022-10-05 DIAGNOSIS — E87.6 HYPOKALEMIA: ICD-10-CM

## 2022-10-05 PROCEDURE — 80069 RENAL FUNCTION PANEL: CPT | Mod: ORL | Performed by: PHYSICIAN ASSISTANT

## 2022-10-06 LAB
ALBUMIN SERPL BCG-MCNC: 4.2 G/DL (ref 3.5–5.2)
ANION GAP SERPL CALCULATED.3IONS-SCNC: 11 MMOL/L (ref 7–15)
BUN SERPL-MCNC: 7.3 MG/DL (ref 8–23)
CALCIUM SERPL-MCNC: 9.4 MG/DL (ref 8.8–10.2)
CHLORIDE SERPL-SCNC: 100 MMOL/L (ref 98–107)
CREAT SERPL-MCNC: 0.72 MG/DL (ref 0.51–0.95)
DEPRECATED HCO3 PLAS-SCNC: 25 MMOL/L (ref 22–29)
GFR SERPL CREATININE-BSD FRML MDRD: >90 ML/MIN/1.73M2
GLUCOSE SERPL-MCNC: 86 MG/DL (ref 70–99)
PHOSPHATE SERPL-MCNC: 3.2 MG/DL (ref 2.5–4.5)
POTASSIUM SERPL-SCNC: 3.9 MMOL/L (ref 3.4–5.3)
SODIUM SERPL-SCNC: 136 MMOL/L (ref 136–145)

## 2023-07-28 ENCOUNTER — LAB REQUISITION (OUTPATIENT)
Dept: LAB | Facility: CLINIC | Age: 68
End: 2023-07-28
Payer: MEDICARE

## 2023-07-28 DIAGNOSIS — A09 INFECTIOUS GASTROENTERITIS AND COLITIS, UNSPECIFIED: ICD-10-CM

## 2023-07-28 LAB
ADV 40+41 DNA STL QL NAA+NON-PROBE: NEGATIVE
ALBUMIN SERPL BCG-MCNC: 4.7 G/DL (ref 3.5–5.2)
ALP SERPL-CCNC: 70 U/L (ref 35–104)
ALT SERPL W P-5'-P-CCNC: 23 U/L (ref 0–50)
ANION GAP SERPL CALCULATED.3IONS-SCNC: 10 MMOL/L (ref 7–15)
AST SERPL W P-5'-P-CCNC: 25 U/L (ref 0–45)
ASTRO TYP 1-8 RNA STL QL NAA+NON-PROBE: NEGATIVE
BILIRUB SERPL-MCNC: 0.5 MG/DL
BUN SERPL-MCNC: 17.8 MG/DL (ref 8–23)
C CAYETANENSIS DNA STL QL NAA+NON-PROBE: NEGATIVE
C DIFF TOX B STL QL: NEGATIVE
CALCIUM SERPL-MCNC: 9.6 MG/DL (ref 8.8–10.2)
CAMPYLOBACTER DNA SPEC NAA+PROBE: NEGATIVE
CHLORIDE SERPL-SCNC: 103 MMOL/L (ref 98–107)
CREAT SERPL-MCNC: 0.8 MG/DL (ref 0.51–0.95)
CRYPTOSP DNA STL QL NAA+NON-PROBE: NEGATIVE
DEPRECATED HCO3 PLAS-SCNC: 26 MMOL/L (ref 22–29)
E COLI O157 DNA STL QL NAA+NON-PROBE: NORMAL
E HISTOLYT DNA STL QL NAA+NON-PROBE: NEGATIVE
EAEC ASTA GENE ISLT QL NAA+PROBE: NEGATIVE
EC STX1+STX2 GENES STL QL NAA+NON-PROBE: NEGATIVE
EPEC EAE GENE STL QL NAA+NON-PROBE: NEGATIVE
ETEC LTA+ST1A+ST1B TOX ST NAA+NON-PROBE: NEGATIVE
G LAMBLIA DNA STL QL NAA+NON-PROBE: NEGATIVE
GFR SERPL CREATININE-BSD FRML MDRD: 80 ML/MIN/1.73M2
GLUCOSE SERPL-MCNC: 88 MG/DL (ref 70–99)
NOROVIRUS GI+II RNA STL QL NAA+NON-PROBE: NEGATIVE
P SHIGELLOIDES DNA STL QL NAA+NON-PROBE: NEGATIVE
POTASSIUM SERPL-SCNC: 4.4 MMOL/L (ref 3.4–5.3)
PROT SERPL-MCNC: 6.9 G/DL (ref 6.4–8.3)
RVA RNA STL QL NAA+NON-PROBE: NEGATIVE
SALMONELLA SP RPOD STL QL NAA+PROBE: NEGATIVE
SAPO I+II+IV+V RNA STL QL NAA+NON-PROBE: NEGATIVE
SHIGELLA SP+EIEC IPAH ST NAA+NON-PROBE: NEGATIVE
SODIUM SERPL-SCNC: 139 MMOL/L (ref 136–145)
V CHOLERAE DNA SPEC QL NAA+PROBE: NEGATIVE
VIBRIO DNA SPEC NAA+PROBE: NEGATIVE
Y ENTEROCOL DNA STL QL NAA+PROBE: NEGATIVE

## 2023-07-28 PROCEDURE — 87507 IADNA-DNA/RNA PROBE TQ 12-25: CPT | Mod: ORL | Performed by: FAMILY MEDICINE

## 2023-07-28 PROCEDURE — 80053 COMPREHEN METABOLIC PANEL: CPT | Mod: ORL | Performed by: FAMILY MEDICINE

## 2023-07-28 PROCEDURE — 87493 C DIFF AMPLIFIED PROBE: CPT | Mod: ORL,XU | Performed by: FAMILY MEDICINE

## 2024-06-18 ENCOUNTER — LAB REQUISITION (OUTPATIENT)
Dept: LAB | Facility: CLINIC | Age: 69
End: 2024-06-18
Payer: COMMERCIAL

## 2024-06-18 DIAGNOSIS — M81.0 AGE-RELATED OSTEOPOROSIS WITHOUT CURRENT PATHOLOGICAL FRACTURE: ICD-10-CM

## 2024-06-18 PROCEDURE — 80053 COMPREHEN METABOLIC PANEL: CPT | Mod: ORL | Performed by: FAMILY MEDICINE

## 2024-06-19 LAB
ALBUMIN SERPL BCG-MCNC: 4.6 G/DL (ref 3.5–5.2)
ALP SERPL-CCNC: 66 U/L (ref 40–150)
ALT SERPL W P-5'-P-CCNC: 15 U/L (ref 0–50)
ANION GAP SERPL CALCULATED.3IONS-SCNC: 9 MMOL/L (ref 7–15)
AST SERPL W P-5'-P-CCNC: 18 U/L (ref 0–45)
BILIRUB SERPL-MCNC: 0.5 MG/DL
BUN SERPL-MCNC: 19.4 MG/DL (ref 8–23)
CALCIUM SERPL-MCNC: 10 MG/DL (ref 8.8–10.2)
CHLORIDE SERPL-SCNC: 104 MMOL/L (ref 98–107)
CREAT SERPL-MCNC: 0.8 MG/DL (ref 0.51–0.95)
DEPRECATED HCO3 PLAS-SCNC: 26 MMOL/L (ref 22–29)
EGFRCR SERPLBLD CKD-EPI 2021: 79 ML/MIN/1.73M2
GLUCOSE SERPL-MCNC: 91 MG/DL (ref 70–99)
POTASSIUM SERPL-SCNC: 3.6 MMOL/L (ref 3.4–5.3)
PROT SERPL-MCNC: 7.4 G/DL (ref 6.4–8.3)
SODIUM SERPL-SCNC: 139 MMOL/L (ref 135–145)

## 2025-03-09 ENCOUNTER — HOSPITAL ENCOUNTER (OUTPATIENT)
Facility: CLINIC | Age: 70
Setting detail: OBSERVATION
Discharge: HOME | End: 2025-03-12
Attending: STUDENT IN AN ORGANIZED HEALTH CARE EDUCATION/TRAINING PROGRAM | Admitting: STUDENT IN AN ORGANIZED HEALTH CARE EDUCATION/TRAINING PROGRAM
Payer: COMMERCIAL

## 2025-03-09 ENCOUNTER — ANCILLARY PROCEDURE (OUTPATIENT)
Dept: RADIOLOGY | Facility: CLINIC | Age: 70
End: 2025-03-09
Payer: COMMERCIAL

## 2025-03-09 DIAGNOSIS — E87.6 HYPOKALEMIA: ICD-10-CM

## 2025-03-09 DIAGNOSIS — A02.0 SALMONELLA GASTROENTERITIS: Primary | ICD-10-CM

## 2025-03-09 DIAGNOSIS — K52.9 ENTERITIS: ICD-10-CM

## 2025-03-09 PROCEDURE — 96375 TX/PRO/DX INJ NEW DRUG ADDON: CPT

## 2025-03-09 PROCEDURE — 96374 THER/PROPH/DIAG INJ IV PUSH: CPT

## 2025-03-09 PROCEDURE — 250N000011 HC RX IP 250 OP 636: Performed by: STUDENT IN AN ORGANIZED HEALTH CARE EDUCATION/TRAINING PROGRAM

## 2025-03-09 PROCEDURE — 99285 EMERGENCY DEPT VISIT HI MDM: CPT | Mod: 25

## 2025-03-09 RX ORDER — METOCLOPRAMIDE HYDROCHLORIDE 5 MG/ML
10 INJECTION INTRAMUSCULAR; INTRAVENOUS ONCE
Status: COMPLETED | OUTPATIENT
Start: 2025-03-10 | End: 2025-03-09

## 2025-03-09 RX ORDER — KETOROLAC TROMETHAMINE 15 MG/ML
15 INJECTION, SOLUTION INTRAMUSCULAR; INTRAVENOUS ONCE
Status: COMPLETED | OUTPATIENT
Start: 2025-03-10 | End: 2025-03-09

## 2025-03-09 RX ORDER — DIPHENHYDRAMINE HYDROCHLORIDE 50 MG/ML
50 INJECTION, SOLUTION INTRAMUSCULAR; INTRAVENOUS ONCE
Status: COMPLETED | OUTPATIENT
Start: 2025-03-10 | End: 2025-03-09

## 2025-03-09 RX ADMIN — METOCLOPRAMIDE 10 MG: 5 INJECTION, SOLUTION INTRAMUSCULAR; INTRAVENOUS at 23:45

## 2025-03-09 RX ADMIN — DIPHENHYDRAMINE HYDROCHLORIDE 50 MG: 50 INJECTION INTRAMUSCULAR; INTRAVENOUS at 23:45

## 2025-03-09 RX ADMIN — KETOROLAC TROMETHAMINE 15 MG: 15 INJECTION, SOLUTION INTRAMUSCULAR; INTRAVENOUS at 23:45

## 2025-03-09 ASSESSMENT — COLUMBIA-SUICIDE SEVERITY RATING SCALE - C-SSRS
1. IN THE PAST MONTH, HAVE YOU WISHED YOU WERE DEAD OR WISHED YOU COULD GO TO SLEEP AND NOT WAKE UP?: NO
2. HAVE YOU ACTUALLY HAD ANY THOUGHTS OF KILLING YOURSELF IN THE PAST MONTH?: NO
6. HAVE YOU EVER DONE ANYTHING, STARTED TO DO ANYTHING, OR PREPARED TO DO ANYTHING TO END YOUR LIFE?: NO

## 2025-03-10 LAB
ADV 40+41 DNA STL QL NAA+NON-PROBE: NEGATIVE
ALBUMIN SERPL BCG-MCNC: 4 G/DL (ref 3.5–5.2)
ALP SERPL-CCNC: 61 U/L (ref 40–150)
ALT SERPL W P-5'-P-CCNC: 15 U/L (ref 0–50)
ANION GAP SERPL CALCULATED.3IONS-SCNC: 12 MMOL/L (ref 7–15)
ANION GAP SERPL CALCULATED.3IONS-SCNC: 13 MMOL/L (ref 7–15)
AST SERPL W P-5'-P-CCNC: 32 U/L (ref 0–45)
ASTRO TYP 1-8 RNA STL QL NAA+NON-PROBE: NEGATIVE
BILIRUB SERPL-MCNC: 0.5 MG/DL
BUN SERPL-MCNC: 14.3 MG/DL (ref 8–23)
BUN SERPL-MCNC: 17.1 MG/DL (ref 8–23)
C CAYETANENSIS DNA STL QL NAA+NON-PROBE: NEGATIVE
CALCIUM SERPL-MCNC: 8.2 MG/DL (ref 8.8–10.4)
CALCIUM SERPL-MCNC: 8.6 MG/DL (ref 8.8–10.4)
CAMPYLOBACTER DNA SPEC NAA+PROBE: NEGATIVE
CHLORIDE SERPL-SCNC: 101 MMOL/L (ref 98–107)
CHLORIDE SERPL-SCNC: 102 MMOL/L (ref 98–107)
CREAT SERPL-MCNC: 0.69 MG/DL (ref 0.51–0.95)
CREAT SERPL-MCNC: 0.7 MG/DL (ref 0.51–0.95)
CRYPTOSP DNA STL QL NAA+NON-PROBE: NEGATIVE
E COLI O157 DNA STL QL NAA+NON-PROBE: ABNORMAL
E HISTOLYT DNA STL QL NAA+NON-PROBE: NEGATIVE
EAEC ASTA GENE ISLT QL NAA+PROBE: NEGATIVE
EC STX1+STX2 GENES STL QL NAA+NON-PROBE: NEGATIVE
EGFRCR SERPLBLD CKD-EPI 2021: >90 ML/MIN/1.73M2
EGFRCR SERPLBLD CKD-EPI 2021: >90 ML/MIN/1.73M2
EPEC EAE GENE STL QL NAA+NON-PROBE: NEGATIVE
ERYTHROCYTE [DISTWIDTH] IN BLOOD BY AUTOMATED COUNT: 14.4 % (ref 10–15)
ETEC LTA+ST1A+ST1B TOX ST NAA+NON-PROBE: NEGATIVE
G LAMBLIA DNA STL QL NAA+NON-PROBE: NEGATIVE
GLUCOSE SERPL-MCNC: 114 MG/DL (ref 70–99)
GLUCOSE SERPL-MCNC: 133 MG/DL (ref 70–99)
HCO3 SERPL-SCNC: 20 MMOL/L (ref 22–29)
HCO3 SERPL-SCNC: 21 MMOL/L (ref 22–29)
HCT VFR BLD AUTO: 35.5 % (ref 35–47)
HGB BLD-MCNC: 12.1 G/DL (ref 11.7–15.7)
MCH RBC QN AUTO: 28.5 PG (ref 26.5–33)
MCHC RBC AUTO-ENTMCNC: 34.1 G/DL (ref 31.5–36.5)
MCV RBC AUTO: 84 FL (ref 78–100)
NOROVIRUS GI+II RNA STL QL NAA+NON-PROBE: NEGATIVE
P SHIGELLOIDES DNA STL QL NAA+NON-PROBE: NEGATIVE
PHOSPHATE SERPL-MCNC: 2.5 MG/DL (ref 2.5–4.5)
PLATELET # BLD AUTO: 141 10E3/UL (ref 150–450)
POTASSIUM SERPL-SCNC: 2.6 MMOL/L (ref 3.4–5.3)
POTASSIUM SERPL-SCNC: 2.8 MMOL/L (ref 3.4–5.3)
POTASSIUM SERPL-SCNC: 2.8 MMOL/L (ref 3.4–5.3)
POTASSIUM SERPL-SCNC: 3.5 MMOL/L (ref 3.4–5.3)
PROT SERPL-MCNC: 6.8 G/DL (ref 6.4–8.3)
RBC # BLD AUTO: 4.25 10E6/UL (ref 3.8–5.2)
RVA RNA STL QL NAA+NON-PROBE: NEGATIVE
SALMONELLA SP RPOD STL QL NAA+PROBE: POSITIVE
SAPO I+II+IV+V RNA STL QL NAA+NON-PROBE: NEGATIVE
SHIGELLA SP+EIEC IPAH ST NAA+NON-PROBE: NEGATIVE
SODIUM SERPL-SCNC: 134 MMOL/L (ref 135–145)
SODIUM SERPL-SCNC: 135 MMOL/L (ref 135–145)
V CHOLERAE DNA SPEC QL NAA+PROBE: NEGATIVE
VIBRIO DNA SPEC NAA+PROBE: NEGATIVE
WBC # BLD AUTO: 5.6 10E3/UL (ref 4–11)
Y ENTEROCOL DNA STL QL NAA+PROBE: NEGATIVE

## 2025-03-10 PROCEDURE — 250N000013 HC RX MED GY IP 250 OP 250 PS 637: Performed by: FAMILY MEDICINE

## 2025-03-10 PROCEDURE — 250N000013 HC RX MED GY IP 250 OP 250 PS 637

## 2025-03-10 PROCEDURE — 84132 ASSAY OF SERUM POTASSIUM: CPT | Performed by: FAMILY MEDICINE

## 2025-03-10 PROCEDURE — 99203 OFFICE O/P NEW LOW 30 MIN: CPT | Performed by: SURGERY

## 2025-03-10 PROCEDURE — 36415 COLL VENOUS BLD VENIPUNCTURE: CPT

## 2025-03-10 PROCEDURE — 258N000003 HC RX IP 258 OP 636

## 2025-03-10 PROCEDURE — 80053 COMPREHEN METABOLIC PANEL: CPT

## 2025-03-10 PROCEDURE — 36415 COLL VENOUS BLD VENIPUNCTURE: CPT | Performed by: FAMILY MEDICINE

## 2025-03-10 PROCEDURE — 96361 HYDRATE IV INFUSION ADD-ON: CPT

## 2025-03-10 PROCEDURE — 84100 ASSAY OF PHOSPHORUS: CPT

## 2025-03-10 PROCEDURE — 82435 ASSAY OF BLOOD CHLORIDE: CPT

## 2025-03-10 PROCEDURE — G0378 HOSPITAL OBSERVATION PER HR: HCPCS

## 2025-03-10 PROCEDURE — 85014 HEMATOCRIT: CPT

## 2025-03-10 PROCEDURE — 87507 IADNA-DNA/RNA PROBE TQ 12-25: CPT

## 2025-03-10 PROCEDURE — 84132 ASSAY OF SERUM POTASSIUM: CPT

## 2025-03-10 PROCEDURE — 80048 BASIC METABOLIC PNL TOTAL CA: CPT

## 2025-03-10 PROCEDURE — 99222 1ST HOSP IP/OBS MODERATE 55: CPT | Mod: GC

## 2025-03-10 PROCEDURE — 80069 RENAL FUNCTION PANEL: CPT

## 2025-03-10 RX ORDER — MULTIVIT WITH MINERALS/LUTEIN
250 TABLET ORAL DAILY
Status: DISCONTINUED | OUTPATIENT
Start: 2025-03-10 | End: 2025-03-12 | Stop reason: HOSPADM

## 2025-03-10 RX ORDER — POTASSIUM CHLORIDE 1500 MG/1
20 TABLET, EXTENDED RELEASE ORAL ONCE
Status: COMPLETED | OUTPATIENT
Start: 2025-03-10 | End: 2025-03-10

## 2025-03-10 RX ORDER — BISMUTH SUBSALICYLATE 262 MG/1
524 TABLET, CHEWABLE ORAL DAILY PRN
Status: DISCONTINUED | OUTPATIENT
Start: 2025-03-10 | End: 2025-03-12 | Stop reason: HOSPADM

## 2025-03-10 RX ORDER — POTASSIUM CHLORIDE 1500 MG/1
40 TABLET, EXTENDED RELEASE ORAL ONCE
Status: COMPLETED | OUTPATIENT
Start: 2025-03-10 | End: 2025-03-10

## 2025-03-10 RX ORDER — AMOXICILLIN 250 MG
1 CAPSULE ORAL 2 TIMES DAILY PRN
Status: DISCONTINUED | OUTPATIENT
Start: 2025-03-10 | End: 2025-03-12 | Stop reason: HOSPADM

## 2025-03-10 RX ORDER — POTASSIUM CHLORIDE 1.5 G/1.58G
20 POWDER, FOR SOLUTION ORAL ONCE
Status: COMPLETED | OUTPATIENT
Start: 2025-03-10 | End: 2025-03-10

## 2025-03-10 RX ORDER — AMOXICILLIN 250 MG
2 CAPSULE ORAL 2 TIMES DAILY PRN
Status: DISCONTINUED | OUTPATIENT
Start: 2025-03-10 | End: 2025-03-12 | Stop reason: HOSPADM

## 2025-03-10 RX ORDER — MULTIPLE VITAMINS W/ MINERALS TAB 9MG-400MCG
1 TAB ORAL DAILY
Status: DISCONTINUED | OUTPATIENT
Start: 2025-03-10 | End: 2025-03-12 | Stop reason: HOSPADM

## 2025-03-10 RX ORDER — VITAMIN E (DL,TOCOPHERYL ACET) 90 MG
200 CAPSULE ORAL DAILY
Status: DISCONTINUED | OUTPATIENT
Start: 2025-03-10 | End: 2025-03-12 | Stop reason: HOSPADM

## 2025-03-10 RX ORDER — VITAMIN B COMPLEX
25 TABLET ORAL DAILY
Status: DISCONTINUED | OUTPATIENT
Start: 2025-03-10 | End: 2025-03-12 | Stop reason: HOSPADM

## 2025-03-10 RX ORDER — ONDANSETRON 4 MG/1
4 TABLET, ORALLY DISINTEGRATING ORAL EVERY 6 HOURS PRN
Status: DISCONTINUED | OUTPATIENT
Start: 2025-03-10 | End: 2025-03-12 | Stop reason: HOSPADM

## 2025-03-10 RX ORDER — PROCHLORPERAZINE MALEATE 5 MG/1
5 TABLET ORAL EVERY 6 HOURS PRN
Status: DISCONTINUED | OUTPATIENT
Start: 2025-03-10 | End: 2025-03-12 | Stop reason: HOSPADM

## 2025-03-10 RX ORDER — ACETAMINOPHEN 325 MG/1
650 TABLET ORAL EVERY 4 HOURS PRN
Status: DISCONTINUED | OUTPATIENT
Start: 2025-03-10 | End: 2025-03-10

## 2025-03-10 RX ORDER — IBUPROFEN 400 MG/1
400 TABLET, FILM COATED ORAL EVERY 4 HOURS PRN
Status: DISCONTINUED | OUTPATIENT
Start: 2025-03-10 | End: 2025-03-12 | Stop reason: HOSPADM

## 2025-03-10 RX ORDER — SODIUM CHLORIDE 9 MG/ML
INJECTION, SOLUTION INTRAVENOUS CONTINUOUS
Status: DISCONTINUED | OUTPATIENT
Start: 2025-03-10 | End: 2025-03-10

## 2025-03-10 RX ORDER — ACETAMINOPHEN 650 MG/1
650 SUPPOSITORY RECTAL EVERY 4 HOURS PRN
Status: DISCONTINUED | OUTPATIENT
Start: 2025-03-10 | End: 2025-03-10

## 2025-03-10 RX ORDER — ONDANSETRON 2 MG/ML
4 INJECTION INTRAMUSCULAR; INTRAVENOUS EVERY 6 HOURS PRN
Status: DISCONTINUED | OUTPATIENT
Start: 2025-03-10 | End: 2025-03-12 | Stop reason: HOSPADM

## 2025-03-10 RX ORDER — AZITHROMYCIN 500 MG/1
1000 TABLET, FILM COATED ORAL ONCE
Status: COMPLETED | OUTPATIENT
Start: 2025-03-10 | End: 2025-03-10

## 2025-03-10 RX ADMIN — POTASSIUM CHLORIDE 20 MEQ: 1500 TABLET, EXTENDED RELEASE ORAL at 06:05

## 2025-03-10 RX ADMIN — Medication 1 TABLET: at 18:11

## 2025-03-10 RX ADMIN — POTASSIUM CHLORIDE 20 MEQ: 1.5 POWDER, FOR SOLUTION ORAL at 14:00

## 2025-03-10 RX ADMIN — SODIUM CHLORIDE: 9 INJECTION, SOLUTION INTRAVENOUS at 01:17

## 2025-03-10 RX ADMIN — Medication 250 MG: at 18:48

## 2025-03-10 RX ADMIN — AZITHROMYCIN DIHYDRATE 1000 MG: 500 TABLET, FILM COATED ORAL at 01:05

## 2025-03-10 RX ADMIN — POTASSIUM CHLORIDE 40 MEQ: 1500 TABLET, EXTENDED RELEASE ORAL at 01:44

## 2025-03-10 RX ADMIN — POTASSIUM CHLORIDE 40 MEQ: 1500 TABLET, EXTENDED RELEASE ORAL at 11:42

## 2025-03-10 RX ADMIN — ACETAMINOPHEN 650 MG: 325 TABLET ORAL at 07:59

## 2025-03-10 RX ADMIN — Medication 25 MCG: at 18:11

## 2025-03-10 RX ADMIN — POTASSIUM CHLORIDE 20 MEQ: 1500 TABLET, EXTENDED RELEASE ORAL at 12:56

## 2025-03-10 RX ADMIN — Medication 200 UNITS: at 18:48

## 2025-03-10 RX ADMIN — SERTRALINE HYDROCHLORIDE 200 MG: 50 TABLET, FILM COATED ORAL at 18:11

## 2025-03-10 RX ADMIN — SODIUM CHLORIDE: 9 INJECTION, SOLUTION INTRAVENOUS at 11:03

## 2025-03-10 ASSESSMENT — ACTIVITIES OF DAILY LIVING (ADL)
ADLS_ACUITY_SCORE: 55
ADLS_ACUITY_SCORE: 51
ADLS_ACUITY_SCORE: 45
ADLS_ACUITY_SCORE: 55
ADLS_ACUITY_SCORE: 61
ADLS_ACUITY_SCORE: 45
ADLS_ACUITY_SCORE: 54
ADLS_ACUITY_SCORE: 54
ADLS_ACUITY_SCORE: 61
ADLS_ACUITY_SCORE: 51
ADLS_ACUITY_SCORE: 54
ADLS_ACUITY_SCORE: 51
ADLS_ACUITY_SCORE: 49
ADLS_ACUITY_SCORE: 61
ADLS_ACUITY_SCORE: 49
ADLS_ACUITY_SCORE: 55
ADLS_ACUITY_SCORE: 61
ADLS_ACUITY_SCORE: 45
ADLS_ACUITY_SCORE: 49
ADLS_ACUITY_SCORE: 59
ADLS_ACUITY_SCORE: 61

## 2025-03-10 NOTE — PROGRESS NOTES
[facility]    Progress Note - Hospitalist Service       Date of Admission:  3/9/2025    Assessment & Plan   Cici Martinez is a 69 year old female admitted on 3/9/2025. She has a history of osteoporosis, depression, anxiety, migraines, and cervicalgia and is admitted for proctocolitis, enteritis, and reactive cholecystitis.     Proctocolitis  Viral gastroenteritis  Nonbloody diarrhea  Improved nausea and vomiting  Leukocytosis  Evidence of active proctocolitis and possible enteritis on CT in the setting of recent travel to [place]. Did have emesis. Still having nonbloody diarrhea. Temperature improved to 100.3. S/p reglan, Toradol, Benadryl, and 1000 mg azithromycin once.   -Zofran and Compazine PRN  -Pepto-Bismol PRN  -AM BMP  -100 mL/h normal saline  -enteric panel   -CLD, advance diet as tolerated    Reactive cholecystitis  Gallbladder distention, sludge, and wall thickening  Presents with two days of nausea, vomiting, and diarrhea, with CT demonstrating gallbladder distention with generalized mural edema but no stone or sludge. Subsequent RUQ US showing gallbladder sludge, distention, and diffuse wall thickening.  LFTs within normal limits.  Reassuringly, no right upper quadrant pain on exam and no jaundice. Suspect findings are reactive in the setting of colitis.  Patient received ceftriaxone in the emergency room prior to transfer to the emergency room for concern about acute cholecystitis.   -General Surgery following, appreciate recommendations and cares  -Deferring any investigation of the gallbladder until the patient is demonstrating actual symptoms  of biliary colic or cholecystitis   -AM CBC    Hypokalemia 2/2 to GI losses  Hyponatremia, resolved  Significant hypokalemia and mild hyponatremia in the setting of ongoing GI losses with vomiting and diarrhea.  Suspect hypovolemic hyponatremia. S/p 40 mill equivalents p.o. replacement and 2 L NS in urgency room  -RN managed  replacement  -Given extra 40 meqs aside from protocol today  -100 mL/h normal saline  -AM BMP    Tension headache   Has new bilateral frontal throbbing headache that is worse with bright lights.   -Ibuprofen 400 mg q4 hours  -Acetaminophen-caffeine 500-65 mg q6 hours  -IVF     Depression  Anxiety  -PTA sertraline     Osteoporosis  Received Reclast outpatient in 2022       Observation Goals: -diagnostic tests and consults completed and resulted, -vital signs normal or at patient baseline, -adequate pain control on oral analgesics, -tolerating oral antibiotics or has plans for home infusion setup, -safe disposition plan has been identified, Nurse to notify provider when observation goals have been met and patient is ready for discharge.  Diet: Clear Liquid Diet    DVT Prophylaxis: Pneumatic Compression Devices  Stearns Catheter: Not present  Fluids: NS at 100ml/hr  Lines: None     Cardiac Monitoring: None  Code Status: Full Code      Clinically Significant Risk Factors Present on Admission        # Hypokalemia: Lowest K = 2.6 mmol/L in last 2 days, will replace as needed  # Hyponatremia: Lowest Na = 134 mmol/L in last 2 days, will monitor as appropriate   # Hypocalcemia: Lowest Ca = 8.2 mg/dL in last 2 days, will monitor and replace as appropriate                              Social Drivers of Health   Tobacco Use: Medium Risk (3/9/2025)    [facility] [employer]    Patient History     Smoking Tobacco Use: Former     Smokeless Tobacco Use: Never         Disposition Plan     Medically Ready for Discharge: Anticipated Tomorrow         The patient's care was discussed with the Attending Physician, Dr. Nigel Zimmer .    Magali Liu, DO  Hospitalist Service  [facility]  Securely message with Danny (more info)  Text page via [employer] Paging/Directory   ______________________________________________________________________    Interval History   Still having  nonbloody, loose stool. Nausea improved. Has a bilateral frontal throbbing headache that is worse with bright lights. Does not feel like migraine. Has not been able to get much rest here. No abdominal pain. Starting CLD.     Physical Exam   Vital Signs: Temp: 99.1  F (37.3  C) Temp src: Oral BP: 106/58 Pulse: 86   Resp: 18 SpO2: 98 % O2 Device: None (Room air)    Weight: 125 lbs 0 oz    Constitutional: Awake, cooperative, no apparent distress, and appears stated age  Eyes: Lids and lashes normal, pupils equal, sclera clear, conjunctiva normal  ENT: Normocephalic, without obvious abnormality, atraumatic, external ears without lesions  Respiratory: No increased work of breathing, good air exchange, clear to auscultation bilaterally, no crackles or wheezing  Cardiovascular: Regular rate and rhythm, systolic murmur, no peripheral edema  GI: Soft, non-distended, non-tender, no masses palpated, no hepatosplenomegally  Skin: no bruising or bleeding on exposed skin  Musculoskeletal: There is no redness, warmth, or swelling of the joints.  Full range of motion noted.    Neurologic: Oriented to person, place, and time.

## 2025-03-10 NOTE — PROGRESS NOTES
PRIMARY DIAGNOSIS: GASTROENTERITIS    OUTPATIENT/OBSERVATION GOALS TO BE MET BEFORE DISCHARGE  1. Orthostatic performed: No    2. Tolerating PO fluid and/or antibiotics (if applicable):  Currently NPO with sips with meds    3. Nausea/Vomiting/Diarrhea symptoms improved: No,  watery, green diarrhea. Intermittent nausea    4. Pain status: Verbalized headache. PRN tylenol given and ice pack applied to back of neck. Declined essential oils.    5. Return to near baseline physical activity: No A1 for generalized weakness

## 2025-03-10 NOTE — ED PROVIDER NOTES
EMERGENCY DEPARTMENT ENCOUNTER      NAME: Cici Martinez  AGE: 69 year old female  YOB: 1955  MRN: [id number]  EVALUATION DATE & TIME: 3/9/2025 11:09 PM    PCP: Vanda Arnold    ED PROVIDER: Jayson Ames MD      Chief Complaint   Patient presents with    Nausea, Vomiting, & Diarrhea         FINAL IMPRESSION:  No diagnosis found.      ED COURSE & MEDICAL DECISION MAKIN:18 PM I met with the patient, obtained history, performed an initial exam, and discussed options and plan for diagnostics and treatment here in the ED.    Pertinent Labs & Imaging studies reviewed. (See chart for details)  69 year old female presents to the Emergency Department for evaluation of nausea and diarrhea.    ED Course as of 25 2351   Sun Mar 09, 2025   2332 Patient is a 69-year-old female with a past medical history significant for osteoporosis, anxiety, who presents to the emergency department with concerns for cholecystitis, in addition to gastroenteritis and proctocolitis, sent for emergency room in [place].  At their facilities around 7 PM they had labs which showed hyponatremia, hypokalemia, normal LFTs and lipase, normal lactate and white count, but a CT scan that showed the findings listed above, and a right upper quadrant ultrasound that showed biliary sludge and concerning findings for acute cholecystitis.  However, on exam she does not have any abdominal pain, and no tenderness on palpation, specifically in the right upper quadrant.  She has no jaundice.  She is fatigued, and is having frequent diarrhea as well as headache and lightheadedness.  Plan for consultation with general surgery, and symptomatic cares.  Emergency room provided ceftriaxone, Tylenol (at 7 PM), Zofran.   2340 Spoke with general surgery Dr. Ernst who feels without abdominal pain or tenderness, unlikely to be true cholecystitis, likely reactive from her GI illness.  Recommended HIDA scan, admission, no further  antibiotics (at least specifically for the possible cholecystitis), and they will see her in the morning.     11:51 PM patient admitted to resident service    Medical Decision Making  Obtained supplemental history:Supplemental history obtained?: Documented in chart and Family Member/Significant Other  Reviewed external records: External records reviewed?: Documented in chart and Outpatient Record: urgency room in [facility] impacted by chronic illness:Documented in Chart  Care significantly affected by social determinants of health:N/A  Did you consider but not order tests?: Work up considered but not performed and documented in chart, if applicable  Did you interpret images independently?: Independent interpretation of ECG and images noted in documentation, when applicable.  Consultation discussion with other provider:Did you involve another provider (consultant, , pharmacy, etc.)?: I discussed the care with another health care provider, see documentation for details.  Admit.  Not Applicable      At the conclusion of the encounter I discussed the results of all of the tests and the disposition. The questions were answered. The patient or family acknowledged understanding and was agreeable with the care plan.     0 minutes of critical care time     MEDICATIONS GIVEN IN THE EMERGENCY:  Medications   metoclopramide (REGLAN) injection 10 mg (10 mg Intravenous $Given 3/9/25 2345)   diphenhydrAMINE (BENADRYL) injection 50 mg (50 mg Intravenous $Given 3/9/25 2345)   ketorolac (TORADOL) injection 15 mg (15 mg Intravenous $Given 3/9/25 2345)       NEW PRESCRIPTIONS STARTED AT TODAY'S ER VISIT  New Prescriptions    No medications on file          =================================================================    HPI    Patient information was obtained from: Patient and patient's sister    Use of : N/A     Cici Martinez is a 69 year old female with a pertinent history of depressive disorder,  gastroenteritis, OFE, osteoporosis who presents to this ED ambulance for evaluation of nausea, vomiting, and diarrhea.    Patient came to the ED after developing nausea, vomiting, diarrhea, and abdominal pain yesterday. Today she feels weak, shaky with chills, short of breath, headache, and nausea. Today Urgent care did labs that showed low potasium and sodium. The CT showed enteritis and poor working gull bladder. While at Urgent care she was given ibuprofen and Zofran. Patient traveled to [place] 2 weeks ago with her sister. Sister is not currently ill with similar symptoms. Patient denies abdominal surgeries or abdominal pain. The only daily medications she takes are sertraline and medications for her osteoporosis.    Per Chart Review: 3/9/25 [place] Urgency room. Labs showed hyponatremia and hypokalemia. CT abdomen shows distention of the gallbladder as well as some signs of enteritis. Right upper quadrant US showed some distention of the gallbladder and findings concerning for cholecystitis. Given acetaminophen, ceftriaxone, iopamidol, Zofran, NaCl and KCl. Sent to hospital for further evaluation.      PAST MEDICAL HISTORY:  No past medical history on file.    PAST SURGICAL HISTORY:  No past surgical history on file.        CURRENT MEDICATIONS:    calcium carbonate 600 mg-vitamin D 400 units (CALTRATE) 600-400 MG-UNIT per tablet  fish oil-omega-3 fatty acids 1000 MG capsule  gabapentin (NEURONTIN) 300 MG capsule  meloxicam (MOBIC) 7.5 MG tablet  multivitamin with minerals (THERA-M) 9 mg iron-400 mcg Tab tablet  omeprazole 20 MG tablet  rizatriptan (MAXALT) 10 MG tablet  sertraline (ZOLOFT) 100 MG tablet  Vitamin D, Cholecalciferol, 25 MCG (1000 UT) TABS        ALLERGIES:  Allergies   Allergen Reactions    Erythromycin Nausea and Vomiting    Prolia [Denosumab] Hives    Sulfa Antibiotics Nausea and Vomiting    Sumatriptan Unknown       FAMILY HISTORY:  No family history on file.    SOCIAL HISTORY:   Social History  "    Socioeconomic History    Marital status: Single   Tobacco Use    Smoking status: Former       VITALS:  BP 99/54 (BP Location: Left arm, Patient Position: Semi-Hunt's, Cuff Size: Adult Regular)   Pulse 82   Temp 97.2  F (36.2  C) (Oral)   Resp 19   Ht 1.575 m (5' 2\")   Wt 56.7 kg (125 lb)   SpO2 99%   BMI 22.86 kg/m      PHYSICAL EXAM    Physical Exam  Vitals and nursing note reviewed.   Constitutional:       General: She is not in acute distress.     Appearance: Normal appearance. She is normal weight. She is not ill-appearing.   HENT:      Head: Normocephalic and atraumatic.      Nose: Nose normal.      Mouth/Throat:      Mouth: Mucous membranes are moist.      Pharynx: Oropharynx is clear.   Eyes:      Extraocular Movements: Extraocular movements intact.      Conjunctiva/sclera: Conjunctivae normal.   Cardiovascular:      Rate and Rhythm: Normal rate and regular rhythm.      Pulses: Normal pulses.      Heart sounds: Normal heart sounds. No murmur heard.  Pulmonary:      Effort: Pulmonary effort is normal. No respiratory distress.      Breath sounds: Normal breath sounds.   Abdominal:      General: Abdomen is flat. There is no distension.      Palpations: Abdomen is soft.      Tenderness: There is no abdominal tenderness. There is no right CVA tenderness or left CVA tenderness.   Musculoskeletal:         General: Normal range of motion.      Cervical back: Normal range of motion.      Right lower leg: No edema.      Left lower leg: No edema.   Skin:     General: Skin is warm and dry.      Capillary Refill: Capillary refill takes less than 2 seconds.      Coloration: Skin is not jaundiced or pale.   Neurological:      General: No focal deficit present.      Mental Status: She is alert and oriented to person, place, and time. Mental status is at baseline.   Psychiatric:         Mood and Affect: Mood normal.         Behavior: Behavior normal.         Thought Content: Thought content normal.         " Judgment: Judgment normal.            LAB:  All pertinent labs reviewed and interpreted.       RADIOLOGY:  Reviewed all pertinent imaging. Please see official radiology report.  No orders to display       PROCEDURES:   None      [employer] Documentation:   CMS Diagnoses:              I, Smiley Card, am serving as a scribe to document services personally performed by Jayson Ames MD based on my observation and the provider's statements to me. I, Jayson Ames MD, attest that Smiley Card is acting in a scribe capacity, has observed my performance of the services and has documented them in accordance with my direction.    Jayson Ames MD  [facility] EMERGENCY ROOM  [address] [id number] [phone]       Jayson Ames MD  03/09/25 [id number]

## 2025-03-10 NOTE — PROGRESS NOTES
PRIMARY DIAGNOSIS: ACUTE PAIN  OUTPATIENT/OBSERVATION GOALS TO BE MET BEFORE DISCHARGE:  1. Pain Status: Verbalized headache during shift    2. Return to near baseline physical activity: Yes. Feeling stronger. Up independent to the bathroom    3. Cleared for discharge by consultants (if involved): N/A    A&Ox4. Temp 99.1. Complains of frontal headache. PRN tylenol given and not effective. MD paged and received new orders for excederin and ibuprofen. Pt declined medications and essential oils at this time. When asked if there is something else she would prefer, she stated she will just deal with it. Ice pack applied. Pt informed to let RN know if she needs anything.

## 2025-03-10 NOTE — PROGRESS NOTES
PRIMARY DIAGNOSIS: Enteritis  OUTPATIENT/OBSERVATION GOALS TO BE MET BEFORE DISCHARGE:  ADLs back to baseline: No    Activity and level of assistance: Up with standby assistance.    Pain status: Pain free.    Return to near baseline physical activity: No     Discharge Planner Nurse   Safe discharge environment identified: No  Barriers to discharge: Yes       Entered by: Robert Gonzales RN 03/10/2025 6:38 PM   Arrived to floor at approximately 1710. VSS and afebrile. No complaints of pain since admission to floor. Placed on enteric precautions until enteric panel results. Refused skin check.   Please review provider order for any additional goals.   Nurse to notify provider when observation goals have been met and patient is ready for discharge.

## 2025-03-10 NOTE — H&P
[facility]    History and Physical - Hospitalist Service       Date of Admission:  3/9/2025    Assessment & Plan      Cici Martinez is a 69 year old female admitted on 3/9/2025. She has a history of osteoporosis, depression, anxiety, migraines, and cervicalgia and is admitted for possible acute cholecystitis in addition to proctocolitis and possible enteritis.    Medication reconciliation not yet completed    Concern for acute cholecystitis  Gallbladder distention, sludge, and wall thickening  Presents with two days of nausea, vomiting, and diarrhea, with CT demonstrating gallbladder distention with generalized mural edema but no stone or sludge with subsequent RUQ US showing gallbladder sludge, distention, and diffuse wall thickening.  LFTs within normal limits.  Reassuringly, no right upper quadrant pain on exam and no jaundice. Suspect findings are reactive in the setting of colitis.  Patient received ceftriaxone in the emergency room prior to transfer to the emergency room for concern about acute cholecystitis.  Case discussed with general surgery in the ED who recommended HIDA scan and deferment of additional antibiotics at this time.  -General Surgery consulted, appreciate recommendations and cares  -HIDA scan in the AM  -NPO until seen by surgery  -AM CMP and CBC    Proctocolitis  Concern for enteritis  Diarrhea, nausea, and vomiting  Leukocytosis  Evidence of active proctocolitis and possible enteritis on CT in the setting of recent travel to [place] however sister who she traveled with is not currently ill.  Emesis and diarrhea nonbloody however patient with mild fever 100.9 in Urgency room. S/p reglan, Toradol, and Benadryl prior to admission.  -1000 mg azithromycin once  -As needed Zofran and Compazine  -As needed Pepto-Bismol  -Could consider enteric panel  -AM BMP  -100 mL/h normal saline    Hypokalemia  Hyponatremia  Significant hypokalemia and mild hyponatremia in the  setting of ongoing GI losses with vomiting and diarrhea.  Suspect hypovolemic hyponatremia. S/p 40 mill equivalents p.o. replacement and 2 L NS in urgency room  -RN managed replacement  -Repeat BMP now  -AM BMP  -100 mL/h normal saline    Depression  Anxiety  -PTA sertraline pending med rec    Osteoporosis  Received Reclast outpatient        Observation Goals: -diagnostic tests and consults completed and resulted, -vital signs normal or at patient baseline, -adequate pain control on oral analgesics, -tolerating oral antibiotics or has plans for home infusion setup, -safe disposition plan has been identified, Nurse to notify provider when observation goals have been met and patient is ready for discharge.    Diet: NPO for Procedure/Surgery per Anesthesia Guidelines Except for: Meds; Clear liquids before procedure/surgery: ADULT (Age GREATER than or Equal to 18 years) - Clear liquids 2 hours before procedure/surgery  DVT Prophylaxis: Pneumatic Compression Devices  Stearns Catheter: Not present  Fluids: NS  Lines: None     Cardiac Monitoring: None  Code Status: Full Code    Clinically Significant Risk Factors Present on Admission                                        Disposition Plan      Expected Discharge Date: 03/10/2025                The patient's care was discussed with the Attending Physician, Dr. Cotter .      Vanda Walter MD  Hospitalist Service  [facility]  Securely message with [employer] (more info)  Text page via [facility] Paging/Directory   ______________________________________________________________________    Chief Complaint   Diarrhea    History is obtained from the patient    History of Present Illness   Cici Martinez is a 69 year old female who has a history of osteoporosis, depression, anxiety, migraines, cervicalgia and is admitted for proctocolitis and possible enteritis, and possible cholecystitis.    Patient presents with 2 days of nausea, vomiting, diarrhea.  Patient  reports decreased oral intake.  Has mild diffuse cramping abdominal pain.  Has been having 1 episode of vomiting and diarrhea per hour.  Denies any blood in vomit or stool.  Denies significant fever or chills.  Has had fatigue, headache, and felt dizzy at times.  Shares that she returned from traveling to [place] about 1 to 2 weeks ago.  She shares she was in [place] for 2 months with her sister.      Patient was seen and evaluated earlier today in the urgency room and was found to have a fever of 100.9, potassium of 2.3, sodium 129, and bicarb of 21.  CT abdomen pelvis urgency room demonstrated nonspecific acute/active proctocolitis and possible enteritis.  There was also gallbladder distention and enhancement without any visible stone or sludge.  Subsequent ultrasound demonstrated gallbladder sludge and suspected acute cholecystitis with recommendation to obtain HIDA scan.  Patient was sent from the urgency room to the emergency department for further evaluation.  She received 2 L of normal saline, 4 mg Zofran, 40 mill equivalents potassium chloride, 2 g ceftriaxone in the urgency room prior to transfer.    Patient lives at home with her sister.  She is retired.    She wishes to be full code.      Past Medical History    No past medical history on file.    Past Surgical History   No past surgical history on file.    Prior to Admission Medications   Prior to Admission Medications   Prescriptions Last Dose Informant Patient Reported? Taking?   Vitamin D, Cholecalciferol, 25 MCG (1000 UT) TABS   Yes No   Sig: Take 1,000 Units by mouth daily   calcium carbonate 600 mg-vitamin D 400 units (CALTRATE) 600-400 MG-UNIT per tablet   Yes No   Sig: Take 1 tablet by mouth 2 times daily   fish oil-omega-3 fatty acids 1000 MG capsule   Yes No   Sig: Take 1 g by mouth daily   gabapentin (NEURONTIN) 300 MG capsule   Yes No   Sig: Take 300 mg by mouth 2 times daily as needed   meloxicam (MOBIC) 7.5 MG tablet   Yes No   Sig: Take 7.5  mg by mouth 2 times daily as needed   multivitamin with minerals (THERA-M) 9 mg iron-400 mcg Tab tablet   Yes No   Sig: [MULTIVITAMIN WITH MINERALS (THERA-M) 9 MG IRON-400 MCG TAB TABLET] Take 1 tablet by mouth daily.   omeprazole 20 MG tablet   Yes No   Sig: Take 20 mg by mouth daily   rizatriptan (MAXALT) 10 MG tablet   Yes No   Sig: Take 10 mg by mouth daily as needed For migraine, may repeat in 2 hours. Max of 20 mg in 24 hours.   sertraline (ZOLOFT) 100 MG tablet   Yes No   Sig: Take 200 mg by mouth daily      Facility-Administered Medications: None         Physical Exam   Vital Signs: Temp: 97.2  F (36.2  C) Temp src: Oral BP: 99/54 Pulse: 82   Resp: 19 SpO2: 99 % O2 Device: None (Room air)    Weight: 125 lbs 0 oz  Constitutional: Drowsy, cooperative, no apparent distress, and appears stated age  Eyes: Lids and lashes normal, pupils equal, sclera clear, conjunctiva normal  ENT: Normocephalic, without obvious abnormality, atraumatic, external ears without lesions, oral pharynx with dry mucous membranes  Respiratory: No increased work of breathing, good air exchange, clear to auscultation bilaterally, no crackles or wheezing  Cardiovascular: Regular rate and rhythm, systolic murmur, no peripheral edema  GI: Soft, non-distended, non-tender, no masses palpated, no hepatosplenomegally  Skin: no bruising or bleeding on exposed skin  Musculoskeletal: There is no redness, warmth, or swelling of the joints.  Full range of motion noted.    Neurologic: Drowsy.  Oriented to person, place, and time.  Cranial nerves II-XII are grossly intact.      Data         Imaging results reviewed over the past 24 hrs:   Recent Results (from the past 24 hours)   CT Abdomen Pelvis w Contrast    Narrative    For Patients: As a result of the 21st Century Cures Act, medical imaging exams and procedure reports are released immediately into your electronic medical record. You may view this report before your referring provider. If you have  questions, please contact your health care provider.    EXAM: CT ABDOMEN PELVIS W  LOCATION: [facility]  DATE: 3/9/2025    INDICATION: fever, abdominal pain and diarrhea  COMPARISON: CT AP 9/27/2022.  TECHNIQUE: CT scan of the abdomen and pelvis was performed following injection of IV contrast. Multiplanar reformats were obtained. Dose reduction techniques were used.  CONTRAST: IOPAMIDOL 300 MG/ML  ML BOTTLE: 100mL    FINDINGS:   LOWER CHEST: Visualized lungs are clear. No pleural effusion. Heart size normal with no pericardial effusion.     HEPATOBILIARY: Benign focal fatty infiltration medial segment left hepatic lobe adjacent to the falciform ligament unchanged. Liver is otherwise normal.  Gallbladder is distended with mucosal hyperenhancement and fairly marked generalized mural edema but no visible stone or sludge material.  No  bile duct dilatation.     PANCREAS: Normal.    SPLEEN: Borderline splenic enlargement at 13 cm.    ADRENAL GLANDS: Normal.    KIDNEYS/BLADDER: Kidneys, ureters and bladder are normal.    BOWEL: Trace abdominal and pelvic ascites. The nonobstructed colon and rectum are mildly distended with liquid stool and there is mucosal hyperenhancement and borderline mural thickening throughout compatible with nonspecific proctocolitis. Fluid-filled normal caliber loops of small intestine with mild mesenteric edema could indicate a nonspecific enteritis. No bowel obstruction. No free air.    LYMPH NODES: No lymphadenopathy.    VASCULATURE: Normal caliber abdominal aorta and patent visceral and iliofemoral arteries. IVC and hepatic, portal, splenic, mesenteric, renal and iliofemoral veins patent.     PELVIC ORGANS: No pelvic mass.    MUSCULOSKELETAL: Degenerative disc disease lumbosacral interspace. Bones appear demineralized.    Impression    1.  CT signs of a nonspecific acute/active proctocolitis and possible enteritis.  2.   Gallbladder is distended with mucosal hyperenhancement  and fairly marked generalized mural edema but no visible stone or sludge material.  Findings could be due to acute cholecystitis or could be reactive. Recommend ultrasound.  3.  Trace abdominal and pelvic ascites.   US Abdomen Limited    Narrative    For Patients: As a result of the 21st Century Cures Act, medical imaging exams and procedure reports are released immediately into your electronic medical record. You may view this report before your referring provider. If you have questions, please contact your health care provider.    EXAM: US ABDOMEN LIMITED GALLBLADDER  LOCATION: [facility]  DATE: 3/9/2025    INDICATION: Abdominal pain.  COMPARISON: Same day CT abdomen/pelvis.  TECHNIQUE: Limited abdominal ultrasound.    FINDINGS:    GALLBLADDER: Gallbladder is mildly distended and contains sludge. There is diffuse wall thickening, measuring up to 6.5 mm. Small amount of pericholecystic fluid is additionally noted. Negative Dsouza's sign, though assessment is likely inaccurate, due to previous administration of pain medication.    BILE DUCTS: No biliary dilatation. The common duct measures 5 mm.    Impression    Gallbladder sludge and suspected acute cholecystitis. Confirmation with HIDA scan is recommended.

## 2025-03-10 NOTE — CONSULTS
General Surgery Consultation  Cici Martinez MRN# [id number]   Age/Sex: 69 year old female YOB: 1955     Reason for consult: 1. Enteritis            Requesting physician: [facility] emergency department                   Assessment and Plan:   Assessment:  Nausea, vomiting and diarrhea with incidental finding of gallbladder wall thickening on imaging.  Patient is asymptomatic from the standpoint of the gallbladder, with no right upper quadrant pain whatsoever.  In this setting I would ignore the imaging findings and treat her clinically.  I do not think we need to pursue a HIDA scan as she is still having an active enteritis.    Plan:  Continue to manage for gastroenteritis conservatively.  Deferring any investigation of the gallbladder until the patient is demonstrating actual symptoms of biliary colic or cholecystitis          Chief Complaint:     Chief Complaint   Patient presents with    Nausea, Vomiting, & Diarrhea        History is obtained from the patient    HPI:   Cici Martinez is a 69 year old female who presents through the [facility] emergency department with diarrhea leading to fatigue for the past 2 to 3 days.  Notes that her were symptom right now is a headache, followed up by the diarrhea itself.  Did have some emesis when this first began 2 days ago, and has been feeling queasy but this has been fairly steady during the course of this illness.  Reports that at no point has she had out right abdominal pain.  Initially presented to the urgency room in [place], where imaging studies were undertaken ultimately prompting her referral to the emergency department due to concern for cholecystitis as well as ongoing management of her enteritis.          Past Medical History:   No past medical history on file.           Past Surgical History:   No past surgical history on file.          Social History:    reports that she has quit smoking. She does not have any smokeless tobacco history on file.            Family History:   No family history on file.           Allergies:     Allergies   Allergen Reactions    Erythromycin Nausea and Vomiting    Prolia [Denosumab] Hives    Sulfa Antibiotics Nausea and Vomiting    Sumatriptan Unknown              Medications:     Prior to Admission medications    Medication Sig Start Date End Date Taking? Authorizing Provider   calcium carbonate 600 mg-vitamin D 400 units (CALTRATE) 600-400 MG-UNIT per tablet Take 1 tablet by mouth 2 times daily    Unknown, Entered By History   fish oil-omega-3 fatty acids 1000 MG capsule Take 1 g by mouth daily    Unknown, Entered By History   gabapentin (NEURONTIN) 300 MG capsule Take 300 mg by mouth 2 times daily as needed 6/29/22   Unknown, Entered By History   meloxicam (MOBIC) 7.5 MG tablet Take 7.5 mg by mouth 2 times daily as needed 8/22/22   Unknown, Entered By History   multivitamin with minerals (THERA-M) 9 mg iron-400 mcg Tab tablet [MULTIVITAMIN WITH MINERALS (THERA-M) 9 MG IRON-400 MCG TAB TABLET] Take 1 tablet by mouth daily. 5/31/16   Provider, Historical   omeprazole 20 MG tablet Take 20 mg by mouth daily    Unknown, Entered By History   rizatriptan (MAXALT) 10 MG tablet Take 10 mg by mouth daily as needed For migraine, may repeat in 2 hours. Max of 20 mg in 24 hours. 6/29/22   Unknown, Entered By History   sertraline (ZOLOFT) 100 MG tablet Take 200 mg by mouth daily 6/29/22   Unknown, Entered By History   Vitamin D, Cholecalciferol, 25 MCG (1000 UT) TABS Take 1,000 Units by mouth daily    Unknown, Entered By History              Review of Systems:   The Review of Systems is negative other than noted in the HPI            Physical Exam:   Patient Vitals for the past 24 hrs:   BP Temp Temp src Pulse Resp SpO2 Height Weight   03/10/25 0757 113/75 100.3  F (37.9  C) Oral 118 18 97 % -- --   03/10/25 0110 107/58 -- -- 88 19 100 % -- --   03/09/25 2342 99/54 -- -- 82 19 99 % -- --   03/09/25 2302 97/53 97.2  F (36.2  C) Oral 82 20 97 %  "1.575 m (5' 2\") 56.7 kg (125 lb)        No intake or output data in the 24 hours ending 03/10/25 0811   Constitutional:   awake, alert, cooperative, no apparent distress, and appears stated age       Eyes:   PERRL, conjunctiva/corneas clear, EOM's intact; no scleral edema or icterus noted        ENT:   Normocephalic, without obvious abnormality, atraumatic, Lips, mucosa, and tongue normal      Lungs:   Normal respiratory effort, no accessory muscle use       Cardiovascular:   Regular rate and rhythm       Abdomen:   Soft, nondistended, clearly nontender with palpation over the upper abdomen and right upper quadrant       Musculoskeletal:   No obvious swelling, bruising or deformity       Skin:   Skin color and texture normal for patient, no rashes or lesions              Data:         All imaging studies reviewed by me.    Results for orders placed or performed during the hospital encounter of 03/09/25 (from the past 24 hours)   Basic metabolic panel   Result Value Ref Range    Sodium 134 (L) 135 - 145 mmol/L    Potassium 2.8 (L) 3.4 - 5.3 mmol/L    Chloride 101 98 - 107 mmol/L    Carbon Dioxide (CO2) 21 (L) 22 - 29 mmol/L    Anion Gap 12 7 - 15 mmol/L    Urea Nitrogen 17.1 8.0 - 23.0 mg/dL    Creatinine 0.70 0.51 - 0.95 mg/dL    GFR Estimate >90 >60 mL/min/1.73m2    Calcium 8.2 (L) 8.8 - 10.4 mg/dL    Glucose 133 (H) 70 - 99 mg/dL   Phosphorus   Result Value Ref Range    Phosphorus 2.5 2.5 - 4.5 mg/dL   CBC with platelets   Result Value Ref Range    WBC Count 5.6 4.0 - 11.0 10e3/uL    RBC Count 4.25 3.80 - 5.20 10e6/uL    Hemoglobin 12.1 11.7 - 15.7 g/dL    Hematocrit 35.5 35.0 - 47.0 %    MCV 84 78 - 100 fL    MCH 28.5 26.5 - 33.0 pg    MCHC 34.1 31.5 - 36.5 g/dL    RDW 14.4 10.0 - 15.0 %    Platelet Count 141 (L) 150 - 450 10e3/uL   Comprehensive metabolic panel   Result Value Ref Range    Sodium 135 135 - 145 mmol/L    Potassium 2.8 (L) 3.4 - 5.3 mmol/L    Carbon Dioxide (CO2) 20 (L) 22 - 29 mmol/L    Anion Gap " 13 7 - 15 mmol/L    Urea Nitrogen 14.3 8.0 - 23.0 mg/dL    Creatinine 0.69 0.51 - 0.95 mg/dL    GFR Estimate >90 >60 mL/min/1.73m2    Calcium 8.6 (L) 8.8 - 10.4 mg/dL    Chloride 102 98 - 107 mmol/L    Glucose 114 (H) 70 - 99 mg/dL    Alkaline Phosphatase 61 40 - 150 U/L    AST 32 0 - 45 U/L    ALT 15 0 - 50 U/L    Protein Total 6.8 6.4 - 8.3 g/dL    Albumin 4.0 3.5 - 5.2 g/dL    Bilirubin Total 0.5 <=1.2 mg/dL        Luis Ernst MD

## 2025-03-10 NOTE — ED TRIAGE NOTES
Pt back from [place] last Tuesday. Was there for 2 months. Has nausea vomiting and diarrhea. States is light headed and dizziness. States has body aches, chill and fevers. General malaise. States sob. Denies chest pain. Nausea still present. States has had a blinding headache for last two days.

## 2025-03-10 NOTE — PROGRESS NOTES
[facility] EMERGENCY ROOM  ED Nurse Handoff Report    ED Chief complaint: Nausea, Vomiting, & Diarrhea      ED Diagnosis:   Final diagnoses:   Enteritis       Code Status: Full Code    Allergies:   Allergies   Allergen Reactions    Erythromycin Nausea and Vomiting    Prolia [Denosumab] Hives    Sulfa Antibiotics Nausea and Vomiting    Sumatriptan Unknown       Patient Story: Pt had recent travel to [place] roughly 2 weeks ago. Upon return c/o n/v/d, mild abd. Pain, light headedness/dizziness and blinding headache. Abdomen CT revealed proctocolitis and possible enteritis  Focused Assessment:  Pain and GI    Treatments and/or interventions provided: Azithromycin, Benadryl, Reglan  Patient's response to treatments and/or interventions: Helped    To be done/followed up on inpatient unit:  Continue to manage for gastroenteritis. Potassium recheck at 18:18 today.    Does this patient have any cognitive concerns?:  None. Pt is A&Ox4    Activity level - Baseline/Home:  Independent  Activity Level - Current:   Independent    Patient's Preferred language: English   Needed?: No    Isolation: Enteric  Infection: Not Applicable  Stool sample pedning  Patient tested for COVID 19 prior to admission: YES, negative  Bariatric?: No    Vital Signs:   Vitals:    03/10/25 0110 03/10/25 0757 03/10/25 1143 03/10/25 1507   BP: 107/58 113/75 106/58 111/59   BP Location: Left arm Left arm Left arm    Patient Position: Semi-Hunt's      Cuff Size: Adult Regular      Pulse: 88 118 86 98   Resp: 19 18  18   Temp:  100.3  F (37.9  C) 99.1  F (37.3  C) 97.8  F (36.6  C)   TempSrc:  Oral Oral    SpO2: 100% 97% 98% 100%   Weight:       Height:           Cardiac Rhythm: N/A not on tele   Family Comments: Sister Maryjane would like update on stool panel results. Pt has given permission to share.  OBS brochure/video discussed/provided to patient/family: N/A    For the majority of the shift this patient's  behavior was Green.   Behavioral interventions performed were None needed.    ED NURSE PHONE NUMBER: [phone]

## 2025-03-10 NOTE — ED NOTES
Expected Patient Referral to ED  10:17 PM    Referring Clinic/Provider:  Dariel GRAY    Reason for referral/Clinical facts:  68 y/o female, recent travel to [place] 2wk ago. Yest n/v/d profuse, mild abd pain. Weak today. Temp 100.9. .  Mild abd ttp on exam. Wbc normal, dry na 129, k 2.3, cr normal. Lft/lipase/mg normal. Ct abd/pelv diffuse enteritis. Gallbladder distended. Given 2L ivf, 2g rocephin, 40meq k po.  Called for direct admit but no beds.    Recommendations provided:  Send to ED for further evaluation    Caller was informed that this institution does possess the capabilities and/or resources to provide for patient and should be transferred to our facility.    Discussed that if direct admit is sought and any hurdles are encountered, this ED would be happy to see the patient and evaluate.    Informed caller that recommendations provided are recommendations based only on the facts provided and that they responsible to accept or reject the advice, or to seek a formal in person consultation as needed and that this ED will see/treat patient should they arrive.      Tamanna Alfredo MD  [facility] EMERGENCY ROOM  [address] [address] [phone]       Tamanna Alfredo MD  03/09/25 4873

## 2025-03-10 NOTE — PHARMACY-ADMISSION MEDICATION HISTORY
Pharmacist Admission Medication History    Admission medication history is complete. The information provided in this note is only as accurate as the sources available at the time of the update.    Information Source(s): Patient via in-person    Pertinent Information:       Changes made to PTA medication list:  Added: None  Deleted: None  Changed: None    Allergies reviewed with patient and updates made in EHR: yes    Medication History Completed By: Eddi Retana RPH 3/10/2025 9:03 AM    PTA Med List   Medication Sig Last Dose/Taking    calcium carbonate 600 mg-vitamin D 400 units (CALTRATE) 600-400 MG-UNIT per tablet Take 1 tablet by mouth 2 times daily Past Week    fish oil-omega-3 fatty acids 1000 MG capsule Take 1 g by mouth daily Past Week    meloxicam (MOBIC) 7.5 MG tablet Take 7.5 mg by mouth 2 times daily as needed Past Week    multivitamin with minerals (THERA-M) 9 mg iron-400 mcg Tab tablet [MULTIVITAMIN WITH MINERALS (THERA-M) 9 MG IRON-400 MCG TAB TABLET] Take 1 tablet by mouth daily. Past Week    rizatriptan (MAXALT) 10 MG tablet Take 10 mg by mouth daily as needed For migraine, may repeat in 2 hours. Max of 20 mg in 24 hours. More than a month    sertraline (ZOLOFT) 100 MG tablet Take 200 mg by mouth daily Past Week    Vitamin D, Cholecalciferol, 25 MCG (1000 UT) TABS Take 1,000 Units by mouth daily Past Week     '

## 2025-03-11 PROBLEM — A02.0 SALMONELLA GASTROENTERITIS: Status: ACTIVE | Noted: 2025-03-11

## 2025-03-11 LAB
POTASSIUM SERPL-SCNC: 2.7 MMOL/L (ref 3.4–5.3)
POTASSIUM SERPL-SCNC: 2.8 MMOL/L (ref 3.4–5.3)
POTASSIUM SERPL-SCNC: 3.9 MMOL/L (ref 3.4–5.3)

## 2025-03-11 PROCEDURE — 36415 COLL VENOUS BLD VENIPUNCTURE: CPT | Performed by: FAMILY MEDICINE

## 2025-03-11 PROCEDURE — 99213 OFFICE O/P EST LOW 20 MIN: CPT | Performed by: SPECIALIST

## 2025-03-11 PROCEDURE — 84132 ASSAY OF SERUM POTASSIUM: CPT | Performed by: FAMILY MEDICINE

## 2025-03-11 PROCEDURE — 36415 COLL VENOUS BLD VENIPUNCTURE: CPT

## 2025-03-11 PROCEDURE — 250N000013 HC RX MED GY IP 250 OP 250 PS 637: Performed by: FAMILY MEDICINE

## 2025-03-11 PROCEDURE — G0378 HOSPITAL OBSERVATION PER HR: HCPCS

## 2025-03-11 PROCEDURE — 84132 ASSAY OF SERUM POTASSIUM: CPT

## 2025-03-11 PROCEDURE — 99232 SBSQ HOSP IP/OBS MODERATE 35: CPT | Mod: GC

## 2025-03-11 PROCEDURE — 250N000013 HC RX MED GY IP 250 OP 250 PS 637

## 2025-03-11 RX ORDER — POTASSIUM CHLORIDE 1.5 G/1.58G
20 POWDER, FOR SOLUTION ORAL ONCE
Status: COMPLETED | OUTPATIENT
Start: 2025-03-11 | End: 2025-03-11

## 2025-03-11 RX ORDER — POTASSIUM CHLORIDE 1500 MG/1
40 TABLET, EXTENDED RELEASE ORAL ONCE
Status: COMPLETED | OUTPATIENT
Start: 2025-03-11 | End: 2025-03-11

## 2025-03-11 RX ORDER — POTASSIUM CHLORIDE 1500 MG/1
20 TABLET, EXTENDED RELEASE ORAL ONCE
Status: COMPLETED | OUTPATIENT
Start: 2025-03-11 | End: 2025-03-11

## 2025-03-11 RX ORDER — LEVOFLOXACIN 500 MG/1
500 TABLET, FILM COATED ORAL DAILY
Status: DISCONTINUED | OUTPATIENT
Start: 2025-03-11 | End: 2025-03-12 | Stop reason: HOSPADM

## 2025-03-11 RX ADMIN — Medication 1 TABLET: at 09:23

## 2025-03-11 RX ADMIN — LEVOFLOXACIN 500 MG: 500 TABLET, FILM COATED ORAL at 12:46

## 2025-03-11 RX ADMIN — Medication 250 MG: at 09:23

## 2025-03-11 RX ADMIN — POTASSIUM CHLORIDE 20 MEQ: 1.5 POWDER, FOR SOLUTION ORAL at 12:59

## 2025-03-11 RX ADMIN — Medication 200 UNITS: at 09:25

## 2025-03-11 RX ADMIN — SERTRALINE HYDROCHLORIDE 200 MG: 50 TABLET, FILM COATED ORAL at 09:23

## 2025-03-11 RX ADMIN — POTASSIUM CHLORIDE 20 MEQ: 1500 TABLET, EXTENDED RELEASE ORAL at 12:46

## 2025-03-11 RX ADMIN — Medication 25 MCG: at 09:23

## 2025-03-11 RX ADMIN — POTASSIUM CHLORIDE 40 MEQ: 1500 TABLET, EXTENDED RELEASE ORAL at 09:23

## 2025-03-11 ASSESSMENT — ACTIVITIES OF DAILY LIVING (ADL)
ADLS_ACUITY_SCORE: 54

## 2025-03-11 NOTE — PLAN OF CARE
Problem: Adult Inpatient Plan of Care  Goal: Plan of Care Review  Description: The Plan of Care Review/Shift note should be completed every shift.  The Outcome Evaluation is a brief statement about your assessment that the patient is improving, declining, or no change.  This information will be displayed automatically on your shift  note.  Outcome: Progressing   Goal Outcome Evaluation:  VSS and afebrile. No complaints of pain. Continuing to have loose stools. Potassium replaced multiple times today, changed to high replacement protocol.

## 2025-03-11 NOTE — PROGRESS NOTES
[facility]    Progress Note - Hospitalist Service       Date of Admission:  3/9/2025    Assessment & Plan   Cici Martinez is a 69 year old female admitted on 3/9/2025. She has a history of osteoporosis, depression, anxiety, migraines, and cervicalgia and is admitted for proctocolitis, enteritis, and reactive cholecystitis.     Salmonella gastroenteritis, uncomplicated  Proctocolitis  Nonbloody diarrhea-improving  Evidence of active proctocolitis and possible enteritis on CT in the setting of recent travel to [place]. Nonbloody diarrhea improving. Afebrile. S/p reglan, Toradol, Benadryl, and 1000 mg azithromycin once. As she is over age of 49 and hospitalized, antibiotic therapy is appropriate for this salmonella gastroenteritis.   -Zofran and Compazine PRN  -Pepto-Bismol PRN  -AM BMP  -enteric panel   -Full regular diet  -low threshold to resume fluids  -500 mg Levaquin for 5 days (3/11-3/15)    Hypokalemia 2/2 to GI losses  Hyponatremia, resolved  Significant hypokalemia and mild hyponatremia in the setting of ongoing GI losses with vomiting and diarrhea.  Suspect hypovolemic hyponatremia.   -RN managed replacement  -AM BMP    Reactive cholecystitis  Gallbladder distention, sludge, and wall thickening  Presents with two days of nausea, vomiting, and diarrhea, with CT demonstrating gallbladder distention with generalized mural edema but no stone or sludge. Subsequent RUQ US showing gallbladder sludge, distention, and diffuse wall thickening.  LFTs within normal limits.  Reassuringly, no right upper quadrant pain on exam and no jaundice. Suspect findings are reactive in the setting of colitis.  Patient received ceftriaxone in the emergency room prior to transfer to the emergency room for concern about acute cholecystitis.   -General Surgery following, appreciate recommendations and cares  -Deferring any investigation of the gallbladder until the patient is demonstrating actual symptoms   of biliary colic or cholecystitis   -AM CBC    Depression  Anxiety  -PTA sertraline     Osteoporosis  Received Reclast outpatient in 2022    Tension headache- resolved   Has new bilateral frontal throbbing headache that is worse with bright lights.   -Ibuprofen 400 mg q4 hours prn  -Acetaminophen-caffeine 500-65 mg q6 hours prn       Observation Goals: -diagnostic tests and consults completed and resulted, -vital signs normal or at patient baseline, -adequate pain control on oral analgesics, -tolerating oral antibiotics or has plans for home infusion setup, -safe disposition plan has been identified, Nurse to notify provider when observation goals have been met and patient is ready for discharge.  Diet: Regular Diet Adult    DVT Prophylaxis: Pneumatic Compression Devices  Stearns Catheter: Not present  Fluids: NS at 100ml/hr  Lines: None     Cardiac Monitoring: None  Code Status: Full Code      Clinically Significant Risk Factors Present on Admission        # Hypokalemia: Lowest K = 2.6 mmol/L in last 2 days, will replace as needed  # Hyponatremia: Lowest Na = 134 mmol/L in last 2 days, will monitor as appropriate   # Hypocalcemia: Lowest Ca = 8.2 mg/dL in last 2 days, will monitor and replace as appropriate                              Social Drivers of Health   Tobacco Use: Medium Risk (3/9/2025)    Received from [facility]    Patient History     Smoking Tobacco Use: Former     Smokeless Tobacco Use: Never         Disposition Plan     Medically Ready for Discharge: Anticipated Tomorrow         The patient's care was discussed with the Attending Physician, Dr. Nigel Zimmer .    Magali Liu DO  Hospitalist Service  [facility]  Securely message with Danny (more info)  Text page via [email or web address] Paging/Directory   ______________________________________________________________________    Interval History   Loose stool improving. Will try regular diet. Still feels  weak but improved from yesterday.     Physical Exam   Vital Signs: Temp: 98.6  F (37  C) Temp src: Oral BP: 99/61 Pulse: 87   Resp: 18 SpO2: 98 % O2 Device: None (Room air)    Weight: 127 lbs 3.2 oz    Constitutional: Awake, cooperative, no apparent distress, and appears stated age  Eyes: Lids and lashes normal, pupils equal, sclera clear, conjunctiva normal  ENT: Normocephalic, without obvious abnormality, atraumatic, external ears without lesions  Respiratory: No increased work of breathing, good air exchange, clear to auscultation bilaterally, no crackles or wheezing  Cardiovascular: Regular rate and rhythm, systolic murmur, no peripheral edema  GI: Soft, non-distended, non-tender, no masses palpated, no hepatosplenomegally  Skin: no bruising or bleeding on exposed skin  Musculoskeletal: There is no redness, warmth, or swelling of the joints.  Full range of motion noted.    Neurologic: Oriented to person, place, and time.

## 2025-03-11 NOTE — PROGRESS NOTES
Feels okay.  Still having diarrhea.  No upper abdominal pain.  Low-grade temp last night.  Now afebrile.  Less tachycardic.  Physical exam:  Abdomen is soft, nontender.  Specifically no tenderness in the right upper quadrant.    Laboratories:  No labs yet today.  White count was normal yesterday.    Impression: Severe gastroenteritis.  Unclear etiology.  Initial imaging was concerning for cholecystitis but she has no pain in the right upper quadrant.  I suspect the gallbladder thickening was just related to the general enteritis that she has.  She had some fluid in the abdomen which can cause that.  She is somewhat improved but still having diarrhea.    Plan: No indication for laparoscopic cholecystectomy at this time.  We will sign off.  Please call if any changes or concerns.

## 2025-03-11 NOTE — PLAN OF CARE
PRIMARY DIAGNOSIS: Enteritis  OUTPATIENT/OBSERVATION GOALS TO BE MET BEFORE DISCHARGE:  ADLs back to baseline: Yes    Activity and level of assistance: Ambulating independently.    Pain status: Pain free.    Return to near baseline physical activity: Yes            Entered by: Gina Ramos RN 03/11/2025 5:59 AM     Please review provider order for any additional goals.   Nurse to notify provider when observation goals have been met and patient is ready for discharge.    Patient A&Ox 4, VSS.    Diet: Orders Placed This Encounter      Clear Liquid Diet     IV access: PIV patent & SL    Pt denies any n/v, SOB or CP     Pain: denies; PRNs used: no     Ambulation: INDP     Care ongoing, call light within reach.    Discharge plan: TBD- home      HLM Admission: 8- Walk 250 feet or more  HLM Daily8- Walk 250 feet or more

## 2025-03-11 NOTE — PLAN OF CARE
PRIMARY DIAGNOSIS: Enteritis  OUTPATIENT/OBSERVATION GOALS TO BE MET BEFORE DISCHARGE:  ADLs back to baseline: Yes    Activity and level of assistance: Ambulating independently.    Pain status: Pain free.    Return to near baseline physical activity: Yes            Entered by: Gina Ramos RN 03/11/2025 1:44 AM     Please review provider order for any additional goals.   Nurse to notify provider when observation goals have been met and patient is ready for discharge.

## 2025-03-12 VITALS
WEIGHT: 124 LBS | BODY MASS INDEX: 22.82 KG/M2 | SYSTOLIC BLOOD PRESSURE: 127 MMHG | TEMPERATURE: 98.1 F | RESPIRATION RATE: 18 BRPM | DIASTOLIC BLOOD PRESSURE: 76 MMHG | HEART RATE: 78 BPM | OXYGEN SATURATION: 97 % | HEIGHT: 62 IN

## 2025-03-12 LAB
ANION GAP SERPL CALCULATED.3IONS-SCNC: 12 MMOL/L (ref 7–15)
BUN SERPL-MCNC: 9.2 MG/DL (ref 8–23)
CALCIUM SERPL-MCNC: 9 MG/DL (ref 8.8–10.4)
CHLORIDE SERPL-SCNC: 105 MMOL/L (ref 98–107)
CREAT SERPL-MCNC: 0.49 MG/DL (ref 0.51–0.95)
EGFRCR SERPLBLD CKD-EPI 2021: >90 ML/MIN/1.73M2
ERYTHROCYTE [DISTWIDTH] IN BLOOD BY AUTOMATED COUNT: 13.8 % (ref 10–15)
GLUCOSE SERPL-MCNC: 88 MG/DL (ref 70–99)
HCO3 SERPL-SCNC: 22 MMOL/L (ref 22–29)
HCT VFR BLD AUTO: 32.3 % (ref 35–47)
HGB BLD-MCNC: 11.5 G/DL (ref 11.7–15.7)
MCH RBC QN AUTO: 28.5 PG (ref 26.5–33)
MCHC RBC AUTO-ENTMCNC: 35.6 G/DL (ref 31.5–36.5)
MCV RBC AUTO: 80 FL (ref 78–100)
PLATELET # BLD AUTO: 160 10E3/UL (ref 150–450)
POTASSIUM SERPL-SCNC: 2.9 MMOL/L (ref 3.4–5.3)
POTASSIUM SERPL-SCNC: 3.9 MMOL/L (ref 3.4–5.3)
RBC # BLD AUTO: 4.03 10E6/UL (ref 3.8–5.2)
SODIUM SERPL-SCNC: 139 MMOL/L (ref 135–145)
WBC # BLD AUTO: 3.4 10E3/UL (ref 4–11)

## 2025-03-12 PROCEDURE — 84132 ASSAY OF SERUM POTASSIUM: CPT

## 2025-03-12 PROCEDURE — 250N000013 HC RX MED GY IP 250 OP 250 PS 637: Performed by: FAMILY MEDICINE

## 2025-03-12 PROCEDURE — 250N000013 HC RX MED GY IP 250 OP 250 PS 637

## 2025-03-12 PROCEDURE — G0378 HOSPITAL OBSERVATION PER HR: HCPCS

## 2025-03-12 PROCEDURE — 99238 HOSP IP/OBS DSCHRG MGMT 30/<: CPT | Mod: GC

## 2025-03-12 PROCEDURE — 85027 COMPLETE CBC AUTOMATED: CPT

## 2025-03-12 PROCEDURE — 80048 BASIC METABOLIC PNL TOTAL CA: CPT

## 2025-03-12 PROCEDURE — 36415 COLL VENOUS BLD VENIPUNCTURE: CPT

## 2025-03-12 RX ORDER — POTASSIUM CHLORIDE 1500 MG/1
40 TABLET, EXTENDED RELEASE ORAL ONCE
Status: COMPLETED | OUTPATIENT
Start: 2025-03-12 | End: 2025-03-12

## 2025-03-12 RX ORDER — POTASSIUM CHLORIDE 1500 MG/1
20 TABLET, EXTENDED RELEASE ORAL ONCE
Status: COMPLETED | OUTPATIENT
Start: 2025-03-12 | End: 2025-03-12

## 2025-03-12 RX ORDER — POTASSIUM CHLORIDE 1500 MG/1
40 TABLET, EXTENDED RELEASE ORAL DAILY
Qty: 14 TABLET | Refills: 0 | Status: SHIPPED | OUTPATIENT
Start: 2025-03-12

## 2025-03-12 RX ORDER — LEVOFLOXACIN 500 MG/1
500 TABLET, FILM COATED ORAL DAILY
Qty: 3 TABLET | Refills: 0 | Status: SHIPPED | OUTPATIENT
Start: 2025-03-13 | End: 2025-03-16

## 2025-03-12 RX ADMIN — Medication 200 UNITS: at 09:15

## 2025-03-12 RX ADMIN — POTASSIUM CHLORIDE 20 MEQ: 1500 TABLET, EXTENDED RELEASE ORAL at 11:51

## 2025-03-12 RX ADMIN — Medication 25 MCG: at 09:16

## 2025-03-12 RX ADMIN — Medication 1 TABLET: at 09:15

## 2025-03-12 RX ADMIN — POTASSIUM CHLORIDE 40 MEQ: 1500 TABLET, EXTENDED RELEASE ORAL at 09:15

## 2025-03-12 RX ADMIN — LEVOFLOXACIN 500 MG: 500 TABLET, FILM COATED ORAL at 11:51

## 2025-03-12 RX ADMIN — SERTRALINE HYDROCHLORIDE 200 MG: 50 TABLET, FILM COATED ORAL at 09:15

## 2025-03-12 RX ADMIN — Medication 250 MG: at 09:16

## 2025-03-12 ASSESSMENT — ACTIVITIES OF DAILY LIVING (ADL)
ADLS_ACUITY_SCORE: 54

## 2025-03-12 NOTE — PLAN OF CARE
Problem: Adult Inpatient Plan of Care  Goal: Absence of Hospital-Acquired Illness or Injury  Intervention: Identify and Manage Fall Risk  Recent Flowsheet Documentation  Taken 3/11/2025 2000 by Cami Obrien RN  Safety Promotion/Fall Prevention:   assistive device/personal items within reach   clutter free environment maintained   increased rounding and observation   increase visualization of patient   lighting adjusted   nonskid shoes/slippers when out of bed   patient and family education   room near nurse's station   room organization consistent   safety round/check completed     Problem: Adult Inpatient Plan of Care  Goal: Optimal Comfort and Wellbeing  Outcome: Progressing   Goal Outcome Evaluation:      Plan of Care Reviewed With: patient    Overall Patient Progress: improvingOverall Patient Progress: improving  Pt alert and oriented, vss on room air, can  voice needs, pain mild, fully independent and continent. Has not had a bowel movement on my shift.

## 2025-03-12 NOTE — DISCHARGE SUMMARY
Patient is A&Ox4, VSS on RA. PIV removed. AVS reviewed, no further questions. Discharging home with a ride from her sister Maryjane.

## 2025-03-12 NOTE — DISCHARGE SUMMARY
[facility]  Discharge Summary - Medicine & Pediatrics       Date of Admission:  3/9/2025  Date of Discharge:  3/12/2025  Discharging Provider: Nigel Zimmer  Discharge Service: Hospitalist Service    Discharge Diagnoses   Salmonella gastroenteritis, uncomplicated  Proctocolitis  Nonbloody diarrhea  Hypokalemia   Reactive cholecystitis   Gallbladder distention, sludge, and wall thickening     Clinically Significant Risk Factors          Follow-ups Needed After Discharge   Follow-up Appointments       Follow-up and recommended labs and tests       Follow up with PCP in 1 week! Please recheck potassium level when you see them.                Discharge Disposition   Discharged to home  Condition at discharge: Stable    Hospital Course   Cici Martinez was admitted on 3/9/2025 for salmonella gastroenteritis and hypokalemia.  The following problems were addressed during her hospitalization:    Salmonella gastroenteritis, uncomplicated  Proctocolitis  Hypokalemia   Evidence of active proctocolitis and possible enteritis on CT in the setting of recent travel to [place]. Nonbloody diarrhea improving at time of discharge. Remained afebrile. As she is over age of 49 and hospitalized, antibiotic therapy was appropriate for this salmonella gastroenteritis. Started on Levaquin. Did require multiple bouts of potassium replacement. Hypokalemia likely due to GI losses. Sent home with 40meq daily for 7 days.     Reactive cholecystitis  Gallbladder distention, sludge, and wall thickening  Presents with two days of nausea, vomiting, and diarrhea, with CT demonstrating gallbladder distention with generalized mural edema but no stone or sludge. Subsequent RUQ US showing gallbladder sludge, distention, and diffuse wall thickening. LFTs within normal limits. Reassuringly, no right upper quadrant pain on exam and no jaundice. Suspect findings are reactive in the setting of colitis. General Surgery recommended  deferring any investigation of the gallbladder until the patient is demonstrating actual symptoms of biliary colic or cholecystitis as well.     Consultations This Hospital Stay   SURGERY GENERAL IP CONSULT    Code Status   Prior       The patient was discussed with Dr. Nigel Liu DO  [address] [address]  Phone: [phone]  Fax: [phone]  ______________________________________________________________________    Physical Exam   Vital Signs: Temp: 98.1  F (36.7  C) Temp src: Oral BP: 127/76 Pulse: 78   Resp: 18 SpO2: 97 % O2 Device: None (Room air)    Weight: 124 lbs 0 oz  Constitutional: Awake, cooperative, no apparent distress, and appears stated age  Eyes: Lids and lashes normal, pupils equal, sclera clear, conjunctiva normal  ENT: Normocephalic, without obvious abnormality, atraumatic, external ears without lesions  Respiratory: No increased work of breathing, good air exchange, clear to auscultation bilaterally, no crackles or wheezing  Cardiovascular: Regular rate and rhythm, systolic murmur, no peripheral edema  GI: Soft, non-distended, non-tender, no masses palpated, no hepatosplenomegally  Skin: no bruising or bleeding on exposed skin  Musculoskeletal: There is no redness, warmth, or swelling of the joints.  Full range of motion noted.    Neurologic: Oriented to person, place, and time.      Primary Care Physician   Vanda Arnold    Discharge Orders      Reason for your hospital stay    Salmonella gastroenteritis and hypokalemia     Follow-up and recommended labs and tests     Follow up with PCP in 1 week! Please recheck potassium level when you see them.     Activity    Your activity upon discharge: activity as tolerated     Diet    Follow this diet upon discharge: Current Diet:Orders Placed This Encounter      Regular Diet Adult       Discharge Medications   Discharge Medication List as  of 3/12/2025 12:58 PM        START taking these medications    Details   levofloxacin (LEVAQUIN) 500 MG tablet Take 1 tablet (500 mg) by mouth daily for 3 days., Disp-3 tablet, R-0, E-Prescribe      potassium chloride ER (K-TAB) 20 MEQ CR tablet Take 2 tablets (40 mEq) by mouth daily., Disp-14 tablet, R-0, E-Prescribe           CONTINUE these medications which have NOT CHANGED    Details   calcium carbonate 600 mg-vitamin D 400 units (CALTRATE) 600-400 MG-UNIT per tablet Take 1 tablet by mouth 2 times daily, Historical      fish oil-omega-3 fatty acids 1000 MG capsule Take 1 g by mouth daily, Historical      meloxicam (MOBIC) 7.5 MG tablet Take 7.5 mg by mouth 2 times daily as needed, Historical      multivitamin with minerals (THERA-M) 9 mg iron-400 mcg Tab tablet [MULTIVITAMIN WITH MINERALS (THERA-M) 9 MG IRON-400 MCG TAB TABLET] Take 1 tablet by mouth daily., Historical      rizatriptan (MAXALT) 10 MG tablet Take 10 mg by mouth daily as needed For migraine, may repeat in 2 hours. Max of 20 mg in 24 hours., Historical      sertraline (ZOLOFT) 100 MG tablet Take 200 mg by mouth daily, Historical      Vitamin D, Cholecalciferol, 25 MCG (1000 UT) TABS Take 1,000 Units by mouth daily, Historical           Allergies   Allergies   Allergen Reactions    Erythromycin Nausea and Vomiting    Prolia [Denosumab] Hives    Sulfa Antibiotics Nausea and Vomiting    Sumatriptan Unknown

## 2025-03-12 NOTE — PROGRESS NOTES
PRIMARY DIAGNOSIS: GASTROENTERITIS    OUTPATIENT/OBSERVATION GOALS TO BE MET BEFORE DISCHARGE  1. Orthostatic performed: No    2. Tolerating PO fluid and/or antibiotics (if applicable): Yes    3. Nausea/Vomiting/Diarrhea symptoms improved: Yes    4. Pain status: Pain free.    5. Return to near baseline physical activity: Yes    Discharge Planner Nurse   Safe discharge environment identified: Yes  Barriers to discharge: Yes       Entered by: Sarah Renee RN 03/12/2025 11:22 AM     Please review provider order for any additional goals.   Nurse to notify provider when observation goals have been met and patient is ready for discharge.Goal Outcome Evaluation:

## 2025-03-12 NOTE — PLAN OF CARE
PRIMARY DIAGNOSIS: GASTROENTERITIS    OUTPATIENT/OBSERVATION GOALS TO BE MET BEFORE DISCHARGE  1. Orthostatic performed: No    2. Tolerating PO fluid and/or antibiotics (if applicable): Yes    3. Nausea/Vomiting/Diarrhea symptoms improved: Yes    4. Pain status: Pain free.    5. Return to near baseline physical activity: Yes    Discharge Planner Nurse   Safe discharge environment identified: Yes  Barriers to discharge: Yes       Entered by: Vilma Jiménez RN 03/12/2025 5:21 AM     Pt. Denies pain. Complained she had one loose stool overnight. Denied nausea. Up independently. Vitals stable, pt, refused 0400 vitals because she wanted to sleep better.

## 2025-03-17 ENCOUNTER — LAB REQUISITION (OUTPATIENT)
Dept: LAB | Facility: CLINIC | Age: 70
End: 2025-03-17
Payer: COMMERCIAL

## 2025-03-17 DIAGNOSIS — Z86.39 PERSONAL HISTORY OF OTHER ENDOCRINE, NUTRITIONAL AND METABOLIC DISEASE: ICD-10-CM

## 2025-03-17 PROCEDURE — 82374 ASSAY BLOOD CARBON DIOXIDE: CPT | Mod: ORL

## 2025-03-17 PROCEDURE — 82310 ASSAY OF CALCIUM: CPT

## 2025-03-17 PROCEDURE — 82947 ASSAY GLUCOSE BLOOD QUANT: CPT | Mod: ORL

## 2025-03-18 ENCOUNTER — LAB REQUISITION (OUTPATIENT)
Dept: LAB | Facility: CLINIC | Age: 70
End: 2025-03-18
Payer: COMMERCIAL

## 2025-03-18 DIAGNOSIS — Z86.39 PERSONAL HISTORY OF OTHER ENDOCRINE, NUTRITIONAL AND METABOLIC DISEASE: ICD-10-CM

## 2025-03-18 LAB
ALBUMIN SERPL BCG-MCNC: 4.6 G/DL (ref 3.5–5.2)
ALP SERPL-CCNC: 70 U/L (ref 40–150)
ALT SERPL W P-5'-P-CCNC: 65 U/L (ref 0–50)
ANION GAP SERPL CALCULATED.3IONS-SCNC: 14 MMOL/L (ref 7–15)
AST SERPL W P-5'-P-CCNC: 40 U/L (ref 0–45)
BILIRUB SERPL-MCNC: 0.4 MG/DL
BUN SERPL-MCNC: 14.5 MG/DL (ref 8–23)
CALCIUM SERPL-MCNC: 10.1 MG/DL (ref 8.8–10.4)
CHLORIDE SERPL-SCNC: 102 MMOL/L (ref 98–107)
CREAT SERPL-MCNC: 0.59 MG/DL (ref 0.51–0.95)
EGFRCR SERPLBLD CKD-EPI 2021: >90 ML/MIN/1.73M2
GLUCOSE SERPL-MCNC: 85 MG/DL (ref 70–99)
HCO3 SERPL-SCNC: 22 MMOL/L (ref 22–29)
POTASSIUM SERPL-SCNC: 4.2 MMOL/L (ref 3.4–5.3)
PROT SERPL-MCNC: 7.6 G/DL (ref 6.4–8.3)
SODIUM SERPL-SCNC: 138 MMOL/L (ref 135–145)

## 2025-03-19 LAB
